# Patient Record
Sex: MALE | Race: WHITE | NOT HISPANIC OR LATINO | Employment: UNEMPLOYED | URBAN - METROPOLITAN AREA
[De-identification: names, ages, dates, MRNs, and addresses within clinical notes are randomized per-mention and may not be internally consistent; named-entity substitution may affect disease eponyms.]

---

## 2017-06-05 ENCOUNTER — OFFICE VISIT (OUTPATIENT)
Dept: PEDIATRICS | Facility: CLINIC | Age: 32
End: 2017-06-05
Payer: COMMERCIAL

## 2017-06-05 VITALS
HEIGHT: 69 IN | SYSTOLIC BLOOD PRESSURE: 98 MMHG | DIASTOLIC BLOOD PRESSURE: 70 MMHG | HEART RATE: 97 BPM | BODY MASS INDEX: 21.62 KG/M2 | TEMPERATURE: 99.6 F | OXYGEN SATURATION: 97 % | WEIGHT: 146 LBS

## 2017-06-05 DIAGNOSIS — F43.23 ADJUSTMENT DISORDER WITH MIXED ANXIETY AND DEPRESSED MOOD: Primary | ICD-10-CM

## 2017-06-05 PROCEDURE — 99203 OFFICE O/P NEW LOW 30 MIN: CPT | Performed by: INTERNAL MEDICINE

## 2017-06-05 RX ORDER — ALPRAZOLAM 0.5 MG
0.5 TABLET ORAL 3 TIMES DAILY PRN
Qty: 20 TABLET | Refills: 0 | Status: SHIPPED | OUTPATIENT
Start: 2017-06-05 | End: 2017-06-26

## 2017-06-05 RX ORDER — BUSPIRONE HYDROCHLORIDE 5 MG/1
TABLET ORAL
Qty: 120 TABLET | Refills: 1 | Status: SHIPPED | OUTPATIENT
Start: 2017-06-05 | End: 2017-06-30 | Stop reason: SINTOL

## 2017-06-05 ASSESSMENT — ANXIETY QUESTIONNAIRES
IF YOU CHECKED OFF ANY PROBLEMS ON THIS QUESTIONNAIRE, HOW DIFFICULT HAVE THESE PROBLEMS MADE IT FOR YOU TO DO YOUR WORK, TAKE CARE OF THINGS AT HOME, OR GET ALONG WITH OTHER PEOPLE: EXTREMELY DIFFICULT
6. BECOMING EASILY ANNOYED OR IRRITABLE: MORE THAN HALF THE DAYS
1. FEELING NERVOUS, ANXIOUS, OR ON EDGE: NEARLY EVERY DAY
GAD7 TOTAL SCORE: 16
5. BEING SO RESTLESS THAT IT IS HARD TO SIT STILL: MORE THAN HALF THE DAYS
7. FEELING AFRAID AS IF SOMETHING AWFUL MIGHT HAPPEN: SEVERAL DAYS
3. WORRYING TOO MUCH ABOUT DIFFERENT THINGS: NEARLY EVERY DAY
2. NOT BEING ABLE TO STOP OR CONTROL WORRYING: NEARLY EVERY DAY

## 2017-06-05 ASSESSMENT — PATIENT HEALTH QUESTIONNAIRE - PHQ9: 5. POOR APPETITE OR OVEREATING: MORE THAN HALF THE DAYS

## 2017-06-05 NOTE — PROGRESS NOTES
"  SUBJECTIVE:                                                    Zeferino Reyna is a 31 year old male who presents to clinic today for the following health issues:      Abnormal Mood Symptoms      Duration: 2-3 weeks     Description:  Depression: YES, pt recently broke up with his girlfriend   Anxiety: YES  Panic attacks: YES- starts to feel shaky, goes to a different room to cry      Accompanying signs and symptoms: see PHQ-9 and UVALDO scores    History (similar episodes/previous evaluation): previously on medication     Precipitating or alleviating factors: None    Therapies tried and outcome: Lexapro (Escitalopram), pt took this in high school and caused him to feel numb     Having significant symptoms over the past several weeks.  Difficulty concentrating at times.  Has needed to leave work 1-2 days per week due to depressive and anxious symptoms, unable to properly do his work.    As noted above, has taken Lexapro in the past, would like to try a different medication.    PHQ-9 SCORE 6/5/2017   Total Score 19     UVALDO-7 SCORE 6/5/2017   Total Score 16     Reviewed sx along with these evaluations. He feels that anxiety is the main driving force for his sx which are interfering with his work.    Problem list and histories reviewed & adjusted, as indicated.    Labs reviewed in EPIC  TSH done in 2010 was normal    Reviewed and updated as needed this visit by clinical staff  Tobacco  Allergies  Med Hx  Surg Hx  Fam Hx  Soc Hx      Reviewed and updated as needed this visit by Provider  Tobacco  Allergies  Meds  Problems  Med Hx  Surg Hx  Fam Hx  Soc Hx          ROS:  Constitutional, HEENT, cardiovascular, pulmonary, gi and gu systems are negative, except as otherwise noted.    OBJECTIVE:                                                    BP 98/70 (BP Location: Right arm, Patient Position: Chair, Cuff Size: Adult Regular)  Pulse 97  Temp 99.6  F (37.6  C) (Tympanic)  Ht 5' 8.5\" (1.74 m)  Wt 146 lb (66.2 " kg)  SpO2 97%  BMI 21.88 kg/m2  Body mass index is 21.88 kg/(m^2).  GENERAL: healthy, alert and no distress  EYES: Eyes grossly normal to inspection, PERRL and conjunctivae and sclerae normal  HENT: no nasal d/c. OP moist.   NECK: no adenopathy, no asymmetry, masses, or scars and thyroid normal to palpation  RESP: lungs clear to auscultation - no rales, rhonchi or wheezes  CV: regular rate and rhythm, normal S1 S2, no S3 or S4, no murmur, click or rub, no peripheral edema and peripheral pulses strong  ABDOMEN: soft, nontender, no hepatosplenomegaly, no masses and bowel sounds normal  MS: no gross musculoskeletal defects noted, no edema  SKIN: no suspicious lesions or rashes  PSYCH: mentation appears normal, affect anxious, well groomed.     ASSESSMENT/PLAN:                                                        ICD-10-CM    1. Adjustment disorder with mixed anxiety and depressed mood F43.23 busPIRone (BUSPAR) 5 MG tablet     ALPRAZolam (XANAX) 0.5 MG tablet     Sx are most c/w adjustment disorder w/ sx of anxiety and depression.  Discussed management options.    Patient Instructions   Start Buspar (buspirone) 5 mg tablets   1 twice per day for 1 week, then increase to 1.5 twice per day for 1 week, then increase to 2 twice per day    Alprazolam (Xanax) 0.5 mg tablets   1 tablet up to 3 times per day as needed for more severe anxiety symptoms    Next visit with me in 3 weeks         Ethan Adams MD  [facility]

## 2017-06-05 NOTE — NURSING NOTE
"Chief Complaint   Patient presents with     Depression       Initial BP 98/70 (BP Location: Right arm, Patient Position: Chair, Cuff Size: Adult Regular)  Pulse 97  Temp 99.6  F (37.6  C) (Tympanic)  Ht 5' 8.5\" (1.74 m)  Wt 146 lb (66.2 kg)  SpO2 97%  BMI 21.88 kg/m2 Estimated body mass index is 21.88 kg/(m^2) as calculated from the following:    Height as of this encounter: 5' 8.5\" (1.74 m).    Weight as of this encounter: 146 lb (66.2 kg).  Medication Reconciliation: complete  "

## 2017-06-05 NOTE — PATIENT INSTRUCTIONS
Start Buspar (buspirone) 5 mg tablets   1 twice per day for 1 week, then increase to 1.5 twice per day for 1 week, then increase to 2 twice per day    Alprazolam (Xanax) 0.5 mg tablets   1 tablet up to 3 times per day as needed for more severe anxiety symptoms    Next visit with me in 3 weeks

## 2017-06-05 NOTE — MR AVS SNAPSHOT
After Visit Summary   6/5/2017    Zeferino Reyna    MRN: [id number]           Patient Information     Date Of Birth          1985        Visit Information        Provider Department      6/5/2017 1:20 PM Ethan Adams MD [facility]        Today's Diagnoses     Adjustment disorder with mixed anxiety and depressed mood    -  1      Care Instructions    Start Buspar (buspirone) 5 mg tablets   1 twice per day for 1 week, then increase to 1.5 twice per day for 1 week, then increase to 2 twice per day    Alprazolam (Xanax) 0.5 mg tablets   1 tablet up to 3 times per day as needed for more severe anxiety symptoms    Next visit with me in 3 weeks                Follow-ups after your visit        Your next 10 appointments already scheduled     Jun 26, 2017  1:20 PM CDT   Office Visit with Ethan Adams MD   [facility] ([facility])    [address]  [address]  [place] [address]   [phone]           Bring a current list of meds and any records pertaining to this visit.  For Physicals, please bring immunization records and any forms needing to be filled out.  Please arrive 10 minutes early to complete paperwork.              Who to contact     If you have questions or need follow up information about today's clinic visit or your schedule please contact [facility] directly at [phone].  Normal or non-critical lab and imaging results will be communicated to you by MyChart, letter or phone within 4 business days after the clinic has received the results. If you do not hear from us within 7 days, please contact the clinic through MyChart or phone. If you have a critical or abnormal lab result, we will notify you by phone as soon as possible.  Submit refill requests through [employer] or call your pharmacy and they will forward the refill request to us. Please allow 3 business days for your refill to be completed.          Additional  "Information About Your Visit        MyChart Information     [employer] lets you send messages to your doctor, view your test results, renew your prescriptions, schedule appointments and more. To sign up, go to [email or web address] . Click on \"Log in\" on the left side of the screen, which will take you to the Welcome page. Then click on \"Sign up Now\" on the right side of the page.     You will be asked to enter the access code listed below, as well as some personal information. Please follow the directions to create your username and password.     Your access code is: [email or web address]  Expires: 9/3/2017  2:03 PM     Your access code will  in 90 days. If you need help or a new code, please call your [place] clinic or [phone].        Care EveryWhere ID     This is your Care EveryWhere ID. This could be used by other organizations to access your [place] medical records  [id number]        Your Vitals Were     Pulse Temperature Height Pulse Oximetry BMI (Body Mass Index)       97 99.6  F (37.6  C) (Tympanic) 5' 8.5\" (1.74 m) 97% 21.88 kg/m2        Blood Pressure from Last 3 Encounters:   17 98/70   14 119/71   11 100/70    Weight from Last 3 Encounters:   17 146 lb (66.2 kg)   14 150 lb (68 kg)   11 137 lb 1.6 oz (62.2 kg)              Today, you had the following     No orders found for display         Today's Medication Changes          These changes are accurate as of: 17  2:03 PM.  If you have any questions, ask your nurse or doctor.               Start taking these medicines.        Dose/Directions    ALPRAZolam 0.5 MG tablet   Commonly known as:  XANAX   Used for:  Adjustment disorder with mixed anxiety and depressed mood   Started by:  Ethan Adams MD        Dose:  0.5 mg   Take 1 tablet (0.5 mg) by mouth 3 times daily as needed for anxiety   Quantity:  20 tablet   Refills:  0       busPIRone 5 MG tablet   Commonly known as:  BUSPAR   Used for:  Adjustment " disorder with mixed anxiety and depressed mood   Started by:  Ethan Adams MD        1 po BID x 1 week, then 1.5 po BID x 1 week then 2 po BID   Quantity:  120 tablet   Refills:  1         These medicines have changed or have updated prescriptions.        Dose/Directions    * amphetamine-dextroamphetamine 30 MG per 24 hr capsule   Commonly known as:  ADDERALL XR   This may have changed:    - how much to take  - additional instructions   Used for:  Attention deficit disorder of adult   Changed by:  Lukas Gonzalez MD        Dose:  30 mg   Take 1 capsule by mouth daily. Along with 10 mg tables   Quantity:  30 capsule   Refills:  0       * amphetamine-dextroamphetamine 10 MG per tablet   Commonly known as:  ADDERALL   This may have changed:  Another medication with the same name was changed. Make sure you understand how and when to take each.   Used for:  Attention deficit disorder of adult   Changed by:  Lukas Gonzalez MD        Dose:  10 mg   Take 1 tablet by mouth daily.   Quantity:  30 tablet   Refills:  0       * Notice:  This list has 2 medication(s) that are the same as other medications prescribed for you. Read the directions carefully, and ask your doctor or other care provider to review them with you.         Where to get your medicines      These medications were sent to [facility] - [place], [place] - [address] [place] [address] AT [address], [place] [address]     Phone:  [phone]     busPIRone 5 MG tablet         Some of these will need a paper prescription and others can be bought over the counter.  Ask your nurse if you have questions.     Bring a paper prescription for each of these medications     ALPRAZolam 0.5 MG tablet                Primary Care Provider Office Phone # Fax #    Ethan Adams [phone]       [facility] [address] [place] [address] [address] [place] [address]        Thank you!     Thank you for choosing [facility]  for your care. Our goal is always to provide you with excellent care. Hearing back from our patients is one way we can continue to improve our services. Please take a few minutes to complete the written survey that you may receive in the mail after your visit with us. Thank you!             Your Updated Medication List - Protect others around you: Learn how to safely use, store and throw away your medicines at www.disposemymeds.org.          This list is accurate as of: 6/5/17  2:03 PM.  Always use your most recent med list.                   Brand Name Dispense Instructions for use    ALPRAZolam 0.5 MG tablet    XANAX    20 tablet    Take 1 tablet (0.5 mg) by mouth 3 times daily as needed for anxiety       * amphetamine-dextroamphetamine 30 MG per 24 hr capsule    ADDERALL XR    30 capsule    Take 1 capsule by mouth daily. Along with 10 mg tables       * amphetamine-dextroamphetamine 10 MG per tablet    ADDERALL    30 tablet    Take 1 tablet by mouth daily.       busPIRone 5 MG tablet    BUSPAR    120 tablet    1 po BID x 1 week, then 1.5 po BID x 1 week then 2 po BID       MULTIVITAMIN PO      Take 1 tablet by mouth daily       OMEGA-3 FISH OIL PO      Take 1 tablet by mouth daily       VITAMIN C PO      Take 1 tablet by mouth daily       * Notice:  This list has 2 medication(s) that are the same as other medications prescribed for you. Read the directions carefully, and ask your doctor or other care provider to review them with you.

## 2017-06-07 ENCOUNTER — TELEPHONE (OUTPATIENT)
Dept: PEDIATRICS | Facility: CLINIC | Age: 32
End: 2017-06-07

## 2017-06-07 NOTE — TELEPHONE ENCOUNTER
Reason for Call:  Form, our goal is to have forms completed with 72 hours, however, some forms may require a visit or additional information.    Type of letter, form or note:  FMLA    Who is the form from?: Patient    Where did the form come from: form was faxed in    What clinic location was the form placed at?: [place]    Where the form was placed: Patient stated he faxed the forms directly to the station at [phone]    What number is listed as a contact on the form?: patient's home phone [phone]       Additional comments: Patient would like provider to know that it is time sensitive. Please fax the forms back to the number listed on them no later than Monday 6/12.    Ingrid Holt,   [facility]

## 2017-06-08 ASSESSMENT — ANXIETY QUESTIONNAIRES: GAD7 TOTAL SCORE: 16

## 2017-06-08 ASSESSMENT — PATIENT HEALTH QUESTIONNAIRE - PHQ9: SUM OF ALL RESPONSES TO PHQ QUESTIONS 1-9: 19

## 2017-06-19 NOTE — TELEPHONE ENCOUNTER
Patient called stating that his employer had not received his FMLA forms yet.     Refaxed the forms to:  [phone] Attn: Absence Management Team    Ingrid Holt,   [facility]

## 2017-06-26 ENCOUNTER — OFFICE VISIT (OUTPATIENT)
Dept: PEDIATRICS | Facility: CLINIC | Age: 32
End: 2017-06-26
Payer: COMMERCIAL

## 2017-06-26 VITALS
SYSTOLIC BLOOD PRESSURE: 102 MMHG | BODY MASS INDEX: 21.92 KG/M2 | HEIGHT: 69 IN | WEIGHT: 148 LBS | TEMPERATURE: 97.9 F | HEART RATE: 85 BPM | DIASTOLIC BLOOD PRESSURE: 56 MMHG | OXYGEN SATURATION: 98 %

## 2017-06-26 DIAGNOSIS — F43.23 ADJUSTMENT DISORDER WITH MIXED ANXIETY AND DEPRESSED MOOD: Primary | ICD-10-CM

## 2017-06-26 DIAGNOSIS — M25.532 LEFT WRIST PAIN: ICD-10-CM

## 2017-06-26 PROCEDURE — 99214 OFFICE O/P EST MOD 30 MIN: CPT | Performed by: INTERNAL MEDICINE

## 2017-06-26 RX ORDER — ALPRAZOLAM 0.5 MG
0.5 TABLET ORAL 3 TIMES DAILY PRN
Qty: 30 TABLET | Refills: 1 | Status: SHIPPED | OUTPATIENT
Start: 2017-06-26 | End: 2019-06-21

## 2017-06-26 NOTE — PROGRESS NOTES
"    SUBJECTIVE:                                                    Zeferino eRyna is a 31 year old male who presents to clinic today for the following health issues:      Anxiety Follow-Up    Status since last visit: Improved within the last few days.     Other associated symptoms: Has been two days where pt woke up and took Buspar and states he \"felt drunk\".    Complicating factors:   Significant life event: Yes-  Broke up with gf.    Current substance abuse: None  Depression symptoms: Yes-  Improved since last visit.   UVALDO-7 SCORE 6/5/2017   Total Score 16     New counselor last week, helpful. Has f/u visit scheduled.     He had been increasing Buspar dose, had reached the 10 mg twice per day dose when he started to note possible side effects as noted above. Decreased back to 5 mg twice per day and seems to be tolerating this dose.     Has been taking alprazolam intermittently. Feels that this does help with with his flares of anxiety.    Has been missing work a few days d/t increased sx.     Left wrist pain  Sx ongoing for the past few weeks.  No specific injury recalled.   Pain is on the lateral aspect of the wrist, over the dorsal aspect of the first metacarpal area.   No wrist swelling noted.   Sx better w/ rest. Worse w/ activity and movement.       Amount of exercise or physical activity: 6-7 days/week for an average of 45-60 minutes    Problems taking medications regularly: No    Medication side effects: feeling \"drunk\".    Diet: regular (no restrictions)    Problem list and histories reviewed & adjusted, as indicated.    Reviewed and updated as needed this visit by clinical staff  Tobacco  Allergies  Meds  Med Hx  Surg Hx  Fam Hx  Soc Hx        ROS:  Constitutional, HEENT, cardiovascular, pulmonary, gi and gu systems are negative, except as otherwise noted.    OBJECTIVE:     /56 (Cuff Size: Adult Regular)  Pulse 85  Temp 97.9  F (36.6  C) (Oral)  Ht 5' 8.5\" (1.74 m)  Wt 148 lb (67.1 kg)  " SpO2 98%  BMI 22.18 kg/m2  Body mass index is 22.18 kg/(m^2).  GEN: No distress  SKIN: No rashes  M/S: Discomfort w/ palpation over the lateral and dorsal aspect of the right first metacarpal. Sx worse w/ resisted thumb extension. No effusion noted. No other joint pains or effusions noted in the hand.   NEURO: Strength is intact. Sensation intact in the hand.  PSYCH: slightly anxious affect. Well groomed. Good eye contact.     ASSESSMENT/PLAN:       ICD-10-CM    1. Adjustment disorder with mixed anxiety and depressed mood F43.23 ALPRAZolam (XANAX) 0.5 MG tablet   2. Left wrist pain M25.532      Persistent anxiety and depression sx, likely related to life adjustment w/ his relationship change.  Having possible side effects w/ Buspar higher dose, tolerating 5 mg twice per day. Continue w/ this dose.  If side effects do not todd, consider changing to SSRI.  Alprazolam prn.    Wrist pain. Possible tendinitis. Wrist brace. RICE. Consider FSOC or ortho referral if sx persist.       Ethan Adams MD  [facility]

## 2017-06-26 NOTE — PATIENT INSTRUCTIONS
Keep your Buspar dose at 10 mg twice per day (2 of the 5 mg tablets per dose)    The dose can be increased in a few weeks if needed - call if you would like to increase the dose    Continue with alprazolam as needed   Do not combine with alcohol    Next visit in about 1 month                   De Quervain's Tenosynovitis      What is de Quervain's tenosynovitis?   De Quervain's tenosynovitis is a painful condition that affects the tendons on the thumb side of your wrist. Tendons, are strong bands of connective tissue that attach muscle to bone. A sheath, or covering, surrounds the tendons that go to your thumb. Tenosynovitis is an irritation of this sheath.   How does it occur?   De Quervain's tenosynovitis is usually cause by overusing your thumb or wrist. This is more likely in activities when your wrist is bent and you use your thumb to  something (such as when you ski or hammer).   Other causes of this condition include:   wrist injuries   rheumatoid arthritis.   What are the symptoms?   Symptoms may include:   pain when you move your thumb or wrist   pain when you make a fist   swelling and pain on the thumb side of your wrist   feeling or hearing creaking as the tendon moves   How is it diagnosed?   Your healthcare provider will examine your wrist and thumb and check for areas that are tender and painful to move. You may have an X-ray to be sure you don't have a broken bone.   How is it treated?   The first treatment is a splint that will cover your wrist and thumb. You need to protect your thumb and wrist. Treatment may also include:   Put an ice pack, gel pack, or package of frozen vegetables, wrapped in a cloth on your thumb and wrist every 3 to 4 hours, for up to 20 minutes at a time.   You could also do ice massage. To do this, first freeze water in a Styrofoam cup, then peel the top of the cup away to expose the ice. Hold the bottom of the cup and rub the ice over your tendon for 5 to 10 minutes. Do  this 3 to 5 times a day for the first 2 days.   Take an anti-inflammatory medicine such as ibuprofen, or other medicine as directed by your provider. Nonsteroidal anti-inflammatory medicines (NSAIDs) may cause stomach bleeding and other problems. These risks increase with age. Read the label and take as directed. Unless recommended by your healthcare provider, do not take for more than 10 days.   Your provider may give you an injection of a corticosteroid medicine.   Follow your provider's instructions for doing exercises to help you recover.   How long will the effects last?   The length of recovery depends on factors such as your age, health, and if you have had a previous injury. Recovery time also depends on the severity of the injury. A mild injury may recover within a few weeks, but a severe injury may take 6 weeks or longer to recover.   When can I return to my normal activities?   Everyone recovers from an injury at a different rate. Return to your activities depends on how soon your wrist recovers, not by how many days or weeks it has been since your injury has occurred. In general, the longer you have symptoms before you start treatment, the longer it will take to get better. The goal of rehabilitation is to return you to your normal activities as soon as is safely possible.   You need to stop doing the activities that cause pain until the tendon has healed. If you continue doing activities that cause pain, your symptoms will return and it will take longer to recover. You may return to your normal activities when it is no longer painful to move your thumb or wrist. You may need to do activities wearing a supportive splint until you no longer have symptoms.   How can I prevent de Quervain's tenosynovitis?   Avoiding activities that overuse your thumb or wrist may prevent de Quervain's tenosynovitis.     De Quervain's Tenosynovitis Rehabilitation Exercises          You may do these exercises when it is not  painful to move your hand.   Opposition stretch: Rest your hand on a table, palm up. Touch the tip of your thumb to the tip of your little finger. Hold this position for 6 seconds and then release. Repeat 10 times.   Wrist stretch: Press the back of the hand on your injured side with your other hand to help bend your wrist. Hold for 15 to 30 seconds. Next, stretch the hand back by pressing the fingers in a backward direction. Hold for 15 to 30 seconds. Keep the arm on your injured side straight during this exercise. Do 3 sets.   Wrist flexion: Hold a can or hammer handle in your hand with your palm facing up. Bend your wrist upward. Slowly lower the weight and return to the starting position. Do 3 sets of 10. Gradually increase the weight of the can or weight you are holding.   Wrist radial deviation strengthening: Put your wrist in the sideways position with your thumb up. Hold a can of soup or a hammer handle and gently bend your wrist up, with the thumb reaching toward the ceiling. Slowly lower to the starting position. Do not move your forearm throughout this exercise. Do 3 sets of 10.   Wrist extension: Hold a soup can or hammer handle in your hand with your palm facing down. Slowly bend your wrist up. Slowly lower the weight down into the starting position. Do 3 sets of 10. Gradually increase the weight of the object you are holding.    strengthening: Squeeze a soft rubber ball and hold the squeeze for 5 seconds. Do 3 sets of 10.   Finger spring: Place a large rubber band around the outside of your thumb and fingers. Open your fingers to stretch the rubber band. Do 3 sets of 10.   Published by [employer].  This content is reviewed periodically and is subject to change as new health information becomes available. The information is intended to inform and educate and is not a replacement for medical evaluation, advice, diagnosis or treatment by a healthcare professional.   Written by Archie Moreno PT, and  Whitney Mcknight PT, [facility], [facility], for [employer].   ? 2010 [facility] and/or its affiliates. All Rights Reserved.   Copyright   [employer] 2011          Published by [employer].  This content is reviewed periodically and is subject to change as new health information becomes available. The information is intended to inform and educate and is not a replacement for medical evaluation, advice, diagnosis or treatment by a healthcare professional.   Written by Mihai Gavin MD, for [employer].   ? 2010 [facility] and/or its affiliates. All Rights Reserved.   Copyright   [employer] 2011

## 2017-06-26 NOTE — MR AVS SNAPSHOT
After Visit Summary   6/26/2017    Zeferino Reyna    MRN: [id number]           Patient Information     Date Of Birth          1985        Visit Information        Provider Department      6/26/2017 1:20 PM Ethan Adams MD [facility]        Today's Diagnoses     Adjustment disorder with mixed anxiety and depressed mood          Care Instructions    Keep your Buspar dose at 10 mg twice per day (2 of the 5 mg tablets per dose)    The dose can be increased in a few weeks if needed - call if you would like to increase the dose    Continue with alprazolam as needed   Do not combine with alcohol    Next visit in about 1 month                   De Quervain's Tenosynovitis      What is de Quervain's tenosynovitis?   De Quervain's tenosynovitis is a painful condition that affects the tendons on the thumb side of your wrist. Tendons, are strong bands of connective tissue that attach muscle to bone. A sheath, or covering, surrounds the tendons that go to your thumb. Tenosynovitis is an irritation of this sheath.   How does it occur?   De Quervain's tenosynovitis is usually cause by overusing your thumb or wrist. This is more likely in activities when your wrist is bent and you use your thumb to  something (such as when you ski or hammer).   Other causes of this condition include:   wrist injuries   rheumatoid arthritis.   What are the symptoms?   Symptoms may include:   pain when you move your thumb or wrist   pain when you make a fist   swelling and pain on the thumb side of your wrist   feeling or hearing creaking as the tendon moves   How is it diagnosed?   Your healthcare provider will examine your wrist and thumb and check for areas that are tender and painful to move. You may have an X-ray to be sure you don't have a broken bone.   How is it treated?   The first treatment is a splint that will cover your wrist and thumb. You need to protect your thumb and wrist. Treatment may also  include:   Put an ice pack, gel pack, or package of frozen vegetables, wrapped in a cloth on your thumb and wrist every 3 to 4 hours, for up to 20 minutes at a time.   You could also do ice massage. To do this, first freeze water in a Styrofoam cup, then peel the top of the cup away to expose the ice. Hold the bottom of the cup and rub the ice over your tendon for 5 to 10 minutes. Do this 3 to 5 times a day for the first 2 days.   Take an anti-inflammatory medicine such as ibuprofen, or other medicine as directed by your provider. Nonsteroidal anti-inflammatory medicines (NSAIDs) may cause stomach bleeding and other problems. These risks increase with age. Read the label and take as directed. Unless recommended by your healthcare provider, do not take for more than 10 days.   Your provider may give you an injection of a corticosteroid medicine.   Follow your provider's instructions for doing exercises to help you recover.   How long will the effects last?   The length of recovery depends on factors such as your age, health, and if you have had a previous injury. Recovery time also depends on the severity of the injury. A mild injury may recover within a few weeks, but a severe injury may take 6 weeks or longer to recover.   When can I return to my normal activities?   Everyone recovers from an injury at a different rate. Return to your activities depends on how soon your wrist recovers, not by how many days or weeks it has been since your injury has occurred. In general, the longer you have symptoms before you start treatment, the longer it will take to get better. The goal of rehabilitation is to return you to your normal activities as soon as is safely possible.   You need to stop doing the activities that cause pain until the tendon has healed. If you continue doing activities that cause pain, your symptoms will return and it will take longer to recover. You may return to your normal activities when it is no  longer painful to move your thumb or wrist. You may need to do activities wearing a supportive splint until you no longer have symptoms.   How can I prevent de Quervain's tenosynovitis?   Avoiding activities that overuse your thumb or wrist may prevent de Quervain's tenosynovitis.     De Quervain's Tenosynovitis Rehabilitation Exercises          You may do these exercises when it is not painful to move your hand.   Opposition stretch: Rest your hand on a table, palm up. Touch the tip of your thumb to the tip of your little finger. Hold this position for 6 seconds and then release. Repeat 10 times.   Wrist stretch: Press the back of the hand on your injured side with your other hand to help bend your wrist. Hold for 15 to 30 seconds. Next, stretch the hand back by pressing the fingers in a backward direction. Hold for 15 to 30 seconds. Keep the arm on your injured side straight during this exercise. Do 3 sets.   Wrist flexion: Hold a can or hammer handle in your hand with your palm facing up. Bend your wrist upward. Slowly lower the weight and return to the starting position. Do 3 sets of 10. Gradually increase the weight of the can or weight you are holding.   Wrist radial deviation strengthening: Put your wrist in the sideways position with your thumb up. Hold a can of soup or a hammer handle and gently bend your wrist up, with the thumb reaching toward the ceiling. Slowly lower to the starting position. Do not move your forearm throughout this exercise. Do 3 sets of 10.   Wrist extension: Hold a soup can or hammer handle in your hand with your palm facing down. Slowly bend your wrist up. Slowly lower the weight down into the starting position. Do 3 sets of 10. Gradually increase the weight of the object you are holding.    strengthening: Squeeze a soft rubber ball and hold the squeeze for 5 seconds. Do 3 sets of 10.   Finger spring: Place a large rubber band around the outside of your thumb and fingers. Open  your fingers to stretch the rubber band. Do 3 sets of 10.   Published by [employer].  This content is reviewed periodically and is subject to change as new health information becomes available. The information is intended to inform and educate and is not a replacement for medical evaluation, advice, diagnosis or treatment by a healthcare professional.   Written by Archie Moreno PT, and Whitney Mcknight PT, [facility], [facility], for [employer].   ? 2010 [employer] and/or its affiliates. All Rights Reserved.   Copyright   [employer] 2011          Published by [employer].  This content is reviewed periodically and is subject to change as new health information becomes available. The information is intended to inform and educate and is not a replacement for medical evaluation, advice, diagnosis or treatment by a healthcare professional.   Written by Mihai Gavin MD, for [employer].   ? 2010 [employer] and/or its affiliates. All Rights Reserved.   [employer]   Clinical Reference [employer] 2011                Follow-ups after your visit        Who to contact     If you have questions or need follow up information about today's clinic visit or your schedule please contact [facility] directly at [phone].  Normal or non-critical lab and imaging results will be communicated to you by [employer], letter or phone within 4 business days after the clinic has received the results. If you do not hear from us within 7 days, please contact the clinic through [employer] or phone. If you have a critical or abnormal lab result, we will notify you by phone as soon as possible.  Submit refill requests through [employer] or call your pharmacy and they will forward the refill request to us. Please allow 3 business days for your refill to be completed.          Additional Information About Your Visit        [employer] Information     [employer] lets you send messages to your doctor, view your test results, renew your  "prescriptions, schedule appointments and more. To sign up, go to [email or web address] . Click on \"Log in\" on the left side of the screen, which will take you to the Welcome page. Then click on \"Sign up Now\" on the right side of the page.     You will be asked to enter the access code listed below, as well as some personal information. Please follow the directions to create your username and password.     Your access code is: [email or web address]  Expires: 9/3/2017  2:03 PM     Your access code will  in 90 days. If you need help or a new code, please call your [place] clinic or [phone].        Care EveryWhere ID     This is your Care EveryWhere ID. This could be used by other organizations to access your [place] medical records  [id number]        Your Vitals Were     Pulse Temperature Height Pulse Oximetry BMI (Body Mass Index)       85 97.9  F (36.6  C) (Oral) 5' 8.5\" (1.74 m) 98% 22.18 kg/m2        Blood Pressure from Last 3 Encounters:   17 102/56   17 98/70   14 119/71    Weight from Last 3 Encounters:   17 148 lb (67.1 kg)   17 146 lb (66.2 kg)   14 150 lb (68 kg)              Today, you had the following     No orders found for display         Today's Medication Changes          These changes are accurate as of: 17  1:47 PM.  If you have any questions, ask your nurse or doctor.               These medicines have changed or have updated prescriptions.        Dose/Directions    * amphetamine-dextroamphetamine 30 MG per 24 hr capsule   Commonly known as:  ADDERALL XR   This may have changed:    - how much to take  - additional instructions   Used for:  Attention deficit disorder of adult   Changed by:  Lukas Gonzalez MD        Dose:  30 mg   Take 1 capsule by mouth daily. Along with 10 mg tables   Quantity:  30 capsule   Refills:  0       * amphetamine-dextroamphetamine 10 MG per tablet   Commonly known as:  ADDERALL   This may have changed:  Another " medication with the same name was changed. Make sure you understand how and when to take each.   Used for:  Attention deficit disorder of adult   Changed by:  Lukas Gonzalez MD        Dose:  10 mg   Take 1 tablet by mouth daily.   Quantity:  30 tablet   Refills:  0       * Notice:  This list has 2 medication(s) that are the same as other medications prescribed for you. Read the directions carefully, and ask your doctor or other care provider to review them with you.         Where to get your medicines      Some of these will need a paper prescription and others can be bought over the counter.  Ask your nurse if you have questions.     Bring a paper prescription for each of these medications     ALPRAZolam 0.5 MG tablet                Primary Care Provider Office Phone # Fax #    Ethan Adams [phone]       [address] [place] [address]        Equal Access to Services     [facility]: Hadii reema lara hadasho Soomaali, waaxda luqadaha, qaybta kaalmada blancoyarosalind, andrey roth . So [facility] [phone].    ATENCIÓN: Si habla español, tiene a rizvi disposición servicios gratuitos de asistencia lingüística. Llame al [phone].    We comply with applicable federal civil rights laws and [place] laws. We do not discriminate on the basis of race, color, national origin, age, disability sex, sexual orientation or gender identity.            Thank you!     Thank you for choosing [facility]  for your care. Our goal is always to provide you with excellent care. Hearing back from our patients is one way we can continue to improve our services. Please take a few minutes to complete the written survey that you may receive in the mail after your visit with us. Thank you!             Your Updated Medication List - Protect others around you: Learn how to safely use, store and throw away your medicines at www.disposemymeds.org.           This list is accurate as of: 6/26/17  1:47 PM.  Always use your most recent med list.                   Brand Name Dispense Instructions for use Diagnosis    ALPRAZolam 0.5 MG tablet    XANAX    30 tablet    Take 1 tablet (0.5 mg) by mouth 3 times daily as needed for anxiety    Adjustment disorder with mixed anxiety and depressed mood       * amphetamine-dextroamphetamine 30 MG per 24 hr capsule    ADDERALL XR    30 capsule    Take 1 capsule by mouth daily. Along with 10 mg tables    Attention deficit disorder of adult       * amphetamine-dextroamphetamine 10 MG per tablet    ADDERALL    30 tablet    Take 1 tablet by mouth daily.    Attention deficit disorder of adult       busPIRone 5 MG tablet    BUSPAR    120 tablet    1 po BID x 1 week, then 1.5 po BID x 1 week then 2 po BID    Adjustment disorder with mixed anxiety and depressed mood       MULTIVITAMIN PO      Take 1 tablet by mouth daily        OMEGA-3 FISH OIL PO      Take 1 tablet by mouth daily        VITAMIN C PO      Take 1 tablet by mouth daily        * Notice:  This list has 2 medication(s) that are the same as other medications prescribed for you. Read the directions carefully, and ask your doctor or other care provider to review them with you.

## 2017-06-27 ENCOUNTER — TELEPHONE (OUTPATIENT)
Dept: PEDIATRICS | Facility: CLINIC | Age: 32
End: 2017-06-27

## 2017-06-27 NOTE — TELEPHONE ENCOUNTER
This writer called pt back advise of note below per PCP 5 mg BID pt verbalized understanding. Will call back at his convince to schedule follow up //Maira Seals MA// June 27, 2017 1:57 PM

## 2017-06-27 NOTE — TELEPHONE ENCOUNTER
Please call:  He should go to 5 mg BID and remain at that dose until he sees me again next month.

## 2017-06-27 NOTE — TELEPHONE ENCOUNTER
Pt. reports continued s/e of Buspar that occurred beginning of last week.    Feels drunk on medication, weak, tired, lethargic, hard time standing up straight, nauseated, and dizzy since taking the full dose (10mg BID). He reports he is not able to function properly at work. Triage did ask if he was taking Buspar along with the Xanax in which he denied.  He will take Buspar at 9pm and Xanax sometime after midnight.    He is ok with taking just the 1 tablet BID to see if this would work better. Ok to lower dose or discontinue?    PCP please advise.     Cristy FERRER RN, BSN, PHN  [facility] RN

## 2017-06-29 ENCOUNTER — TELEPHONE (OUTPATIENT)
Dept: PEDIATRICS | Facility: CLINIC | Age: 32
End: 2017-06-29

## 2017-06-29 NOTE — TELEPHONE ENCOUNTER
Symptoms have improved since stopping the Buspar.  Will remain off the Buspar.    Forwarded to Dr. Adams as well for any further recommendations as he didn't tolerate the Buspar.  KARLI Isaacs RN

## 2017-06-29 NOTE — TELEPHONE ENCOUNTER
Just to clarify:    Patient was taking 10mg BID up until 6/27/17, when he was decreased to 5mg BID, which he took for one day (as it sounds like his last dose was 6/28/17 in the AM).    If the above is true AND if his side effects/symptoms have worsened since tapering, I would recommend that he continue to take 5mg BID x3 more days then stop.    If his side effects are improving, then he likely does not need to taper off at this point.    Kathy Lackey MD  Internal Medicine-Pediatrics

## 2017-06-29 NOTE — TELEPHONE ENCOUNTER
Discussed with Dr. Lackey.  If symptoms are no worse, may just stop the Buspar.  If symptoms not improved by Monday, will need to schedule an appointment for evaluation.  KARLI Isaacs RN

## 2017-06-29 NOTE — TELEPHONE ENCOUNTER
"Patient began Buspar three weeks ago,  and titrated to 10 mg twice daily.  Has been on this dose for 1.5 weeks.  On 06/27,he was titrated down to 5 mg twice daily due to side effects.  Yesterday am he took his last dose of Buspar.  Even on lower dose, he notes side effects of feeling nauseated and vomiting, diarrhea, and  feels dizzy.  Had to leave work yesterday because it is difficult to concentrate  It \"feels like I'm drunk\".  Patient would like to stop this medication-any need to titrate off, or can he just stop this medication  Do you know how long side effect will last?  Please call patient at [phone] (H).  OK TO leave a detailed message.  KARLI Isaacs RN    "

## 2017-06-30 ENCOUNTER — TELEPHONE (OUTPATIENT)
Dept: PEDIATRICS | Facility: CLINIC | Age: 32
End: 2017-06-30

## 2017-06-30 DIAGNOSIS — R11.0 NAUSEA: ICD-10-CM

## 2017-06-30 DIAGNOSIS — R11.0 NAUSEA: Primary | ICD-10-CM

## 2017-06-30 RX ORDER — ONDANSETRON 4 MG/1
4 TABLET, FILM COATED ORAL EVERY 6 HOURS PRN
Qty: 20 TABLET | Refills: 1 | Status: SHIPPED | OUTPATIENT
Start: 2017-06-30 | End: 2017-06-30

## 2017-06-30 RX ORDER — ONDANSETRON 4 MG/1
4 TABLET, FILM COATED ORAL EVERY 6 HOURS PRN
Qty: 20 TABLET | Refills: 1 | Status: SHIPPED | OUTPATIENT
Start: 2017-06-30 | End: 2017-07-24

## 2017-06-30 NOTE — TELEPHONE ENCOUNTER
I called and LM.  Recommend remaining off Buspar.  For now, I would not add a new agent until nausea sx improve.  Zofran rx sent in for nausea.    If nausea does not improve over the next few days should schedule a visit or UC evaluation.    If anxiety sx persist, call back next week. Consider starting citalopram 10 mg QD.

## 2017-06-30 NOTE — TELEPHONE ENCOUNTER
Pt stopped Buspar a few days ago, but still experiencing lingering feeling of nausea.  He is wondering if he can get a prescription for something for nausea.

## 2017-06-30 NOTE — TELEPHONE ENCOUNTER
Pt left town before getting med at [facility], requests sent to [facility] in [place].  Done.

## 2017-07-24 ENCOUNTER — OFFICE VISIT (OUTPATIENT)
Dept: PEDIATRICS | Facility: CLINIC | Age: 32
End: 2017-07-24
Payer: COMMERCIAL

## 2017-07-24 VITALS
TEMPERATURE: 98.2 F | HEART RATE: 87 BPM | WEIGHT: 150 LBS | SYSTOLIC BLOOD PRESSURE: 104 MMHG | DIASTOLIC BLOOD PRESSURE: 70 MMHG | BODY MASS INDEX: 22.48 KG/M2 | OXYGEN SATURATION: 97 %

## 2017-07-24 DIAGNOSIS — M62.838 NECK MUSCLE SPASM: ICD-10-CM

## 2017-07-24 DIAGNOSIS — F98.8 ATTENTION DEFICIT DISORDER (ADD) WITHOUT HYPERACTIVITY: Primary | ICD-10-CM

## 2017-07-24 DIAGNOSIS — F43.23 ADJUSTMENT DISORDER WITH MIXED ANXIETY AND DEPRESSED MOOD: ICD-10-CM

## 2017-07-24 DIAGNOSIS — Z11.3 SCREEN FOR STD (SEXUALLY TRANSMITTED DISEASE): ICD-10-CM

## 2017-07-24 PROCEDURE — 87491 CHLMYD TRACH DNA AMP PROBE: CPT | Performed by: INTERNAL MEDICINE

## 2017-07-24 PROCEDURE — 99214 OFFICE O/P EST MOD 30 MIN: CPT | Performed by: INTERNAL MEDICINE

## 2017-07-24 PROCEDURE — 87591 N.GONORRHOEAE DNA AMP PROB: CPT | Performed by: INTERNAL MEDICINE

## 2017-07-24 RX ORDER — DEXTROAMPHETAMINE SACCHARATE, AMPHETAMINE ASPARTATE, DEXTROAMPHETAMINE SULFATE AND AMPHETAMINE SULFATE 2.5; 2.5; 2.5; 2.5 MG/1; MG/1; MG/1; MG/1
10 TABLET ORAL DAILY
Qty: 30 TABLET | Refills: 0 | Status: SHIPPED | OUTPATIENT
Start: 2017-07-24 | End: 2017-09-26

## 2017-07-24 RX ORDER — DEXTROAMPHETAMINE SACCHARATE, AMPHETAMINE ASPARTATE MONOHYDRATE, DEXTROAMPHETAMINE SULFATE AND AMPHETAMINE SULFATE 5; 5; 5; 5 MG/1; MG/1; MG/1; MG/1
20 CAPSULE, EXTENDED RELEASE ORAL DAILY
Qty: 30 CAPSULE | Refills: 0 | Status: SHIPPED | OUTPATIENT
Start: 2017-07-24 | End: 2017-09-26

## 2017-07-24 ASSESSMENT — ANXIETY QUESTIONNAIRES
GAD7 TOTAL SCORE: 12
1. FEELING NERVOUS, ANXIOUS, OR ON EDGE: SEVERAL DAYS
7. FEELING AFRAID AS IF SOMETHING AWFUL MIGHT HAPPEN: SEVERAL DAYS
6. BECOMING EASILY ANNOYED OR IRRITABLE: NEARLY EVERY DAY
3. WORRYING TOO MUCH ABOUT DIFFERENT THINGS: SEVERAL DAYS
2. NOT BEING ABLE TO STOP OR CONTROL WORRYING: NEARLY EVERY DAY
5. BEING SO RESTLESS THAT IT IS HARD TO SIT STILL: SEVERAL DAYS

## 2017-07-24 ASSESSMENT — PATIENT HEALTH QUESTIONNAIRE - PHQ9: 5. POOR APPETITE OR OVEREATING: MORE THAN HALF THE DAYS

## 2017-07-24 NOTE — MR AVS SNAPSHOT
After Visit Summary   7/24/2017    Zeferino Reyna    MRN: [id number]           Patient Information     Date Of Birth          1985        Visit Information        Provider Department      7/24/2017 2:00 PM Ethan Adams MD [facility]        Today's Diagnoses     Attention deficit disorder (ADD) without hyperactivity    -  1    Neck muscle spasm        Adjustment disorder with mixed anxiety and depressed mood        Screen for STD (sexually transmitted disease)          Care Instructions    Check labwork today   I will contact you with the results    [facility] ([facility]) will contact you to schedule physical therapy    Call when you need medication refills          Follow-ups after your visit        Additional Services     [facility] PT, HAND, AND CHIROPRACTIC REFERRAL       **This order will print in the [facility] Scheduling Office**    Physical Therapy, Hand Therapy and Chiropractic Care are available through:    *[facility]  *[facility]  *[facility]    Call one number to schedule at any of the above locations: [phone].    Your provider has referred you to: Physical Therapy at [facility] or [facility]    Indication/Reason for Referral: Neck Pain  Onset of Illness: months  Therapy Orders: Evaluate and Treat  Special Programs: None  Special Request: None    Kaur Dietrich      Additional Comments for the Therapist or Chiropractor:       Please be aware that coverage of these services is subject to the terms and limitations of your health insurance plan.  Call member services at your health plan with any benefit or coverage questions.      Please bring the following to your appointment:    *Your personal calendar for scheduling future appointments  *Comfortable clothing                  Who to contact     If you have questions or need follow up information about today's clinic visit or your schedule please contact [facility]  "[facility] directly at [phone].  Normal or non-critical lab and imaging results will be communicated to you by [employer], letter or phone within 4 business days after the clinic has received the results. If you do not hear from us within 7 days, please contact the clinic through [employer] or phone. If you have a critical or abnormal lab result, we will notify you by phone as soon as possible.  Submit refill requests through [employer] or call your pharmacy and they will forward the refill request to us. Please allow 3 business days for your refill to be completed.          Additional Information About Your Visit        [employer] Information     [employer] lets you send messages to your doctor, view your test results, renew your prescriptions, schedule appointments and more. To sign up, go to [email or web address] . Click on \"Log in\" on the left side of the screen, which will take you to the Welcome page. Then click on \"Sign up Now\" on the right side of the page.     You will be asked to enter the access code listed below, as well as some personal information. Please follow the directions to create your username and password.     Your access code is: [email or web address]  Expires: 9/3/2017  2:03 PM     Your access code will  in 90 days. If you need help or a new code, please call your [facility] or [phone].        Care EveryWhere ID     This is your Care EveryWhere ID. This could be used by other organizations to access your [facility] medical records  [id number]        Your Vitals Were     Pulse Temperature Pulse Oximetry BMI (Body Mass Index)          87 98.2  F (36.8  C) (Oral) 97% 22.48 kg/m2         Blood Pressure from Last 3 Encounters:   17 104/70   17 102/56   17 98/70    Weight from Last 3 Encounters:   17 150 lb (68 kg)   17 148 lb (67.1 kg)   17 146 lb (66.2 kg)              We Performed the Following     CHLAMYDIA TRACHOMATIS PCR     [facility] HAND, [facility] " REFERRAL     NEISSERIA GONORRHOEA PCR          Today's Medication Changes          These changes are accurate as of: 7/24/17  2:34 PM.  If you have any questions, ask your nurse or doctor.               These medicines have changed or have updated prescriptions.        Dose/Directions    * amphetamine-dextroamphetamine 30 MG per 24 hr capsule   Commonly known as:  ADDERALL XR   This may have changed:    - how much to take  - additional instructions   Used for:  Attention deficit disorder of adult   Changed by:  Lukas Gonzalez MD        Dose:  30 mg   Take 1 capsule by mouth daily. Along with 10 mg tables   Quantity:  30 capsule   Refills:  0       * amphetamine-dextroamphetamine 10 MG per tablet   Commonly known as:  ADDERALL   This may have changed:    - Another medication with the same name was added. Make sure you understand how and when to take each.  - Another medication with the same name was changed. Make sure you understand how and when to take each.   Used for:  Attention deficit disorder of adult   Changed by:  Lukas Gonzalez MD        Dose:  10 mg   Take 1 tablet by mouth daily.   Quantity:  30 tablet   Refills:  0       * amphetamine-dextroamphetamine 20 MG per 24 hr capsule   Commonly known as:  ADDERALL XR   This may have changed:  You were already taking a medication with the same name, and this prescription was added. Make sure you understand how and when to take each.   Used for:  Attention deficit disorder (ADD) without hyperactivity   Changed by:  Ethan Adams MD        Dose:  20 mg   Take 1 capsule (20 mg) by mouth daily   Quantity:  30 capsule   Refills:  0       * amphetamine-dextroamphetamine 10 MG per tablet   Commonly known as:  ADDERALL   This may have changed:  You were already taking a medication with the same name, and this prescription was added. Make sure you understand how and when to take each.   Used for:  Attention deficit disorder (ADD) without hyperactivity   Changed  by:  Ethan Adams MD        Dose:  10 mg   Take 1 tablet (10 mg) by mouth daily   Quantity:  30 tablet   Refills:  0       * Notice:  This list has 4 medication(s) that are the same as other medications prescribed for you. Read the directions carefully, and ask your doctor or other care provider to review them with you.         Where to get your medicines      Some of these will need a paper prescription and others can be bought over the counter.  Ask your nurse if you have questions.     Bring a paper prescription for each of these medications     amphetamine-dextroamphetamine 10 MG per tablet    amphetamine-dextroamphetamine 20 MG per 24 hr capsule                Primary Care Provider Office Phone # Fax #    Ethan Adams [phone]       [facility] [address] [place] [address] [address] [place] [address]        Equal Access to Services     VITA DAVILA AH: Hadii aad ku hadasho Sobetty, waaxda luqadaha, qaybta kaalmada adeegyada, andrey roth . So [facility] [phone].    ATENCIÓN: Si habla español, tiene a rizvi disposición servicios gratuitos de asistencia lingüística. Llame al [phone].    We comply with applicable federal civil rights laws and [place] laws. We do not discriminate on the basis of race, color, national origin, age, disability sex, sexual orientation or gender identity.            Thank you!     Thank you for choosing [facility]  for your care. Our goal is always to provide you with excellent care. Hearing back from our patients is one way we can continue to improve our services. Please take a few minutes to complete the written survey that you may receive in the mail after your visit with us. Thank you!             Your Updated Medication List - Protect others around you: Learn how to safely use, store and throw away your medicines at www.disposemymeds.org.          This list is accurate as of: 7/24/17  2:34 PM.  Always use your most  recent med list.                   Brand Name Dispense Instructions for use Diagnosis    ALPRAZolam 0.5 MG tablet    XANAX    30 tablet    Take 1 tablet (0.5 mg) by mouth 3 times daily as needed for anxiety    Adjustment disorder with mixed anxiety and depressed mood       * amphetamine-dextroamphetamine 30 MG per 24 hr capsule    ADDERALL XR    30 capsule    Take 1 capsule by mouth daily. Along with 10 mg tables    Attention deficit disorder of adult       * amphetamine-dextroamphetamine 10 MG per tablet    ADDERALL    30 tablet    Take 1 tablet by mouth daily.    Attention deficit disorder of adult       * amphetamine-dextroamphetamine 20 MG per 24 hr capsule    ADDERALL XR    30 capsule    Take 1 capsule (20 mg) by mouth daily    Attention deficit disorder (ADD) without hyperactivity       * amphetamine-dextroamphetamine 10 MG per tablet    ADDERALL    30 tablet    Take 1 tablet (10 mg) by mouth daily    Attention deficit disorder (ADD) without hyperactivity       MULTIVITAMIN PO      Take 1 tablet by mouth daily        OMEGA-3 FISH OIL PO      Take 1 tablet by mouth daily        VITAMIN C PO      Take 1 tablet by mouth daily        * Notice:  This list has 4 medication(s) that are the same as other medications prescribed for you. Read the directions carefully, and ask your doctor or other care provider to review them with you.

## 2017-07-24 NOTE — PROGRESS NOTES
SUBJECTIVE:                                                    Zeferino Reyna is a 31 year old male who presents to clinic today for the following health issues:    Was treated for anxiety, started Buspar last month.  Did not tolerate- nausea  Stopped taking.  Is taking alprazolam - taking typically in the evening 2-3 times per week.  Has tried and not done well w/ Lexapro and other medications in the past.    Has been feeling more anxious and depressed the past few days.  Reviewed several options for medications, he is not currently interested in a daily controller medication for his anxiety or depression.       Followup of ADD    Taking Medication as prescribed: NO - pt has been out for 3+ months.     Was getting rx from outside clinics.     Side Effects:  None    Medication Helping Symptoms:  not applicable    Pt relates he was taking Adderall XR 20 mg am, and then Adderall 10 mg in the afternoon.    Review of his chart shows that he had these rx last from our clinic in 2012. At that time was 30 mg XR with 10 mg regular Adderall.      Also would like routine screening for STD's.   No hx of STD.   Concern about a recent contact.  No current sx of dysuria, hematuria, rashes    Neck muscle spasm. Ongoing for the past few days. No trauma recalled.  Discomfort w/ ROM of the neck.     Problem list and histories reviewed & adjusted, as indicated.      Reviewed and updated as needed this visit by clinical staff  Tobacco  Allergies  Meds  Med Hx  Surg Hx  Fam Hx  Soc Hx      Reviewed and updated as needed this visit by Provider  Tobacco  Allergies  Meds  Problems  Med Hx  Surg Hx  Fam Hx  Soc Hx          ROS:  Constitutional, HEENT, cardiovascular, pulmonary, gi and gu systems are negative, except as otherwise noted.      OBJECTIVE:   /70 (Cuff Size: Adult Regular)  Pulse 87  Temp 98.2  F (36.8  C) (Oral)  Wt 150 lb (68 kg)  SpO2 97%  BMI 22.48 kg/m2  Body mass index is 22.48 kg/(m^2).  GEN: No  distress. Anxious.  SKIN: No rashes  HEENT: PERRL. EOMI. No nasal d/c. OP moist.   NECK: Supple. No LAD or TM. Discomfort w/ palpation over the mid to upper paraspinal muscles.   LUNGS: Clear to auscultation bilaterally. No rhonchi, rales, wheezes or retractions.  CV: Regular rate and rhythm.  No murmurs, rubs or gallops. Pulses 2+ radial.  EXTR: No edema  PSYCH: Anxious. Well groomed. Good eye contact.   NEURO: No focal deficits in the UE noted.    ASSESSMENT/PLAN:       ICD-10-CM    1. Attention deficit disorder (ADD) without hyperactivity F98.8 amphetamine-dextroamphetamine (ADDERALL XR) 20 MG per 24 hr capsule     amphetamine-dextroamphetamine (ADDERALL) 10 MG per tablet   2. Neck muscle spasm M62.838 [facility], HAND, AND CHIROPRACTIC REFERRAL   3. Adjustment disorder with mixed anxiety and depressed mood F43.23    4. Screen for STD (sexually transmitted disease) Z11.3 NEISSERIA GONORRHOEA PCR     CHLAMYDIA TRACHOMATIS PCR     Adjustment disorder w/ anxiety and depression. Per pt is better than at his last OV, but is still having ongoing sx.  Encouraged counseling.  Discussed alternate controller medications, will consider.      Patient Instructions   Check labwork today   I will contact you with the results    [facility] ([facility]) will contact you to schedule physical therapy    Call when you need medication refills    Ethan Adams MD  [facility]

## 2017-07-24 NOTE — LETTER
[facility]  [address] [facility] [address]                  [phone]   July 27, 2017    Zeferino Reyna  [address] [facility] [address]      Zeferino:    Your STD evaluation is complete and all results are normal.    - Chlamydia testing is normal  - Gonorrea testing is normal    Please feel free to contact me if you have any questions or concerns.      Ethan Adams MD      Results for orders placed or performed in visit on 07/24/17   NEISSERIA GONORRHOEA PCR   Result Value Ref Range    Specimen Descrip Urine     N Gonorrhea PCR  NEG     Negative   Negative for N. gonorrhoeae rRNA by transcription mediated amplification.   A negative result by transcription mediated amplification does not preclude the   presence of N. gonorrhoeae infection because results are dependent on proper   and adequate collection, absence of inhibitors, and sufficient rRNA to be   detected.     CHLAMYDIA TRACHOMATIS PCR   Result Value Ref Range    Specimen Description Urine     Chlamydia Trachomatis PCR  NEG     Negative   Negative for C. trachomatis rRNA by transcription mediated amplification.   A negative result by transcription mediated amplification does not preclude the   presence of C. trachomatis infection because results are dependent on proper   and adequate collection, absence of inhibitors, and sufficient rRNA to be   detected.

## 2017-07-24 NOTE — PATIENT INSTRUCTIONS
Check labwork today   I will contact you with the results    [facility] ([facility]) will contact you to schedule physical therapy    Call when you need medication refills

## 2017-07-24 NOTE — NURSING NOTE
"Chief Complaint   Patient presents with     Recheck Medication       Initial /70 (Cuff Size: Adult Regular)  Pulse 87  Temp 98.2  F (36.8  C) (Oral)  Wt 150 lb (68 kg)  SpO2 97%  BMI 22.48 kg/m2 Estimated body mass index is 22.48 kg/(m^2) as calculated from the following:    Height as of 6/26/17: 5' 8.5\" (1.74 m).    Weight as of this encounter: 150 lb (68 kg).  Medication Reconciliation: felicia Alexandre   "

## 2017-07-25 ASSESSMENT — ANXIETY QUESTIONNAIRES: GAD7 TOTAL SCORE: 12

## 2017-07-25 ASSESSMENT — PATIENT HEALTH QUESTIONNAIRE - PHQ9: SUM OF ALL RESPONSES TO PHQ QUESTIONS 1-9: 18

## 2017-07-26 LAB
C TRACH DNA SPEC QL NAA+PROBE: NORMAL
N GONORRHOEA DNA SPEC QL NAA+PROBE: NORMAL
SPECIMEN SOURCE: NORMAL
SPECIMEN SOURCE: NORMAL

## 2017-08-09 ENCOUNTER — VIRTUAL VISIT (OUTPATIENT)
Dept: PEDIATRICS | Facility: CLINIC | Age: 32
End: 2017-08-09
Payer: COMMERCIAL

## 2017-08-09 ENCOUNTER — TELEPHONE (OUTPATIENT)
Dept: PEDIATRICS | Facility: CLINIC | Age: 32
End: 2017-08-09

## 2017-08-09 DIAGNOSIS — G25.81 RESTLESS LEG SYNDROME: ICD-10-CM

## 2017-08-09 DIAGNOSIS — F51.01 PRIMARY INSOMNIA: Primary | ICD-10-CM

## 2017-08-09 PROCEDURE — 99441 ZZC PHYSICIAN TELEPHONE EVALUATION 5-10 MIN: CPT | Performed by: INTERNAL MEDICINE

## 2017-08-09 RX ORDER — GABAPENTIN 300 MG/1
300 CAPSULE ORAL AT BEDTIME
Qty: 30 CAPSULE | Refills: 1 | Status: SHIPPED | OUTPATIENT
Start: 2017-08-09 | End: 2017-11-03

## 2017-08-09 NOTE — PROGRESS NOTES
Telephone visit  CC: Shayne    HPI:  Recently dx w/ anxiety sx.    Has been taking alprazolam about once per week.  Has not yet started Adderall.    Takes melatonin - 10 mg.  Has been taking for an extended time.    Has tried Nyquil - no change.    Has not been rx other sleep medications.    Has noted that legs feel hot at nighttime. Will drink cold water to help his legs feel better.  Does not necessarily move his legs, but does take up to 1 hour for his legs to feel normal.    Discussed medication options.    Will try gabapentin 300 mg QHS. Take for at least 1 week. The dose could be increased to 600 mg QHS if needed. Call if needs a dose increase.  If gabapentin is not effective, consider changing to trazodone  mg QHS.     Total phone time: 9 minutes 40 seconds.

## 2017-08-09 NOTE — TELEPHONE ENCOUNTER
Patient calls.  He was seen on 7/24 and taking xanax for anxiety and Adderall for ADD.  Has had trouble sleeping in the last few weeks.  Trouble with falling and staying asleep.  States he can eventually fall asleep and then is awake about an hour later, falls asleep again and up again every hour.  Gets about 4-5 hours of sleep per night total.   Feels tired at daytime as a result.  Does not think that xanax is helping with sleep.  Has tried melatonin in the last few months with no good results.      Please advise-patient would rather not schedule another appointment-ok for a telephone visit for this?  Ok to call patient back and leave a detailed message.    Alba Soliman, RN  Message handled by Nurse Triage.

## 2017-08-09 NOTE — TELEPHONE ENCOUNTER
Called and spoke with pt. Scheduled phone visit for end of day today.  ERIK Hyman August 9, 2017 1:16 PM

## 2017-08-09 NOTE — TELEPHONE ENCOUNTER
Please call:  Since the insomnia is a new issue it would be best to set up a phone visit to discuss options to help manage his symptoms.  I could add him at the end of the schedule today or at lunch / end of day on Friday.

## 2017-09-26 DIAGNOSIS — F98.8 ATTENTION DEFICIT DISORDER (ADD) WITHOUT HYPERACTIVITY: ICD-10-CM

## 2017-09-26 RX ORDER — DEXTROAMPHETAMINE SACCHARATE, AMPHETAMINE ASPARTATE MONOHYDRATE, DEXTROAMPHETAMINE SULFATE AND AMPHETAMINE SULFATE 5; 5; 5; 5 MG/1; MG/1; MG/1; MG/1
20 CAPSULE, EXTENDED RELEASE ORAL DAILY
Qty: 30 CAPSULE | Refills: 0 | Status: SHIPPED | OUTPATIENT
Start: 2017-09-26 | End: 2019-06-21

## 2017-09-26 RX ORDER — DEXTROAMPHETAMINE SACCHARATE, AMPHETAMINE ASPARTATE, DEXTROAMPHETAMINE SULFATE AND AMPHETAMINE SULFATE 2.5; 2.5; 2.5; 2.5 MG/1; MG/1; MG/1; MG/1
10 TABLET ORAL DAILY
Qty: 30 TABLET | Refills: 0 | Status: SHIPPED | OUTPATIENT
Start: 2017-09-26 | End: 2019-06-21 | Stop reason: ALTCHOICE

## 2017-09-26 NOTE — TELEPHONE ENCOUNTER
Patient calling requesting refills and he would like to  the scripts at the  when ready.    Adderall XR  Last Written Prescription Date:  7/24/17  Last Fill Quantity: 30,   # refills: 0  Last Office Visit with [facility], [facility] or  Health prescribing provider: 7/24/17  Future Office visit:       Routing refill request to provider for review/approval because:  Drug not on the [facility], P or  Health refill protocol or controlled substance    Adderall 10mg  Last Written Prescription Date:  7/24/17  Last Fill Quantity: 30,   # refills: 0  Last Office Visit with [facility], [facility] or  Health prescribing provider: 7/24/17  Future Office visit:       Routing refill request to provider for review/approval because:  Drug not on the [facility], P or  Health refill protocol or controlled substance

## 2017-10-03 ENCOUNTER — OFFICE VISIT (OUTPATIENT)
Dept: PEDIATRICS | Facility: CLINIC | Age: 32
End: 2017-10-03
Payer: COMMERCIAL

## 2017-10-03 VITALS
BODY MASS INDEX: 22.93 KG/M2 | DIASTOLIC BLOOD PRESSURE: 54 MMHG | OXYGEN SATURATION: 100 % | HEART RATE: 91 BPM | SYSTOLIC BLOOD PRESSURE: 108 MMHG | TEMPERATURE: 97.5 F | WEIGHT: 153 LBS

## 2017-10-03 DIAGNOSIS — F32.1 MODERATE MAJOR DEPRESSION (H): Primary | ICD-10-CM

## 2017-10-03 DIAGNOSIS — M77.8 TENDINITIS OF FINGER: ICD-10-CM

## 2017-10-03 PROCEDURE — 99214 OFFICE O/P EST MOD 30 MIN: CPT | Performed by: INTERNAL MEDICINE

## 2017-10-03 RX ORDER — VENLAFAXINE HYDROCHLORIDE 37.5 MG/1
37.5 CAPSULE, EXTENDED RELEASE ORAL DAILY
Qty: 45 CAPSULE | Refills: 1 | Status: SHIPPED | OUTPATIENT
Start: 2017-10-03 | End: 2017-11-07 | Stop reason: DRUGHIGH

## 2017-10-03 ASSESSMENT — PATIENT HEALTH QUESTIONNAIRE - PHQ9: SUM OF ALL RESPONSES TO PHQ QUESTIONS 1-9: 20

## 2017-10-03 NOTE — MR AVS SNAPSHOT
After Visit Summary   10/3/2017    Zeferino Reyna    MRN: [id number]           Patient Information     Date Of Birth          1985        Visit Information        Provider Department      10/3/2017 9:20 AM [facility]        Today's Diagnoses     Moderate major depression (H)    -  1    Tendinitis of finger          Care Instructions    Effexor 37.5 mg capsules   Start with 1 per day. After 1-2 weeks, you can increase to 2 capsules once per day    Call to set up counseling visits.    Counseling clinic options:    Edin Houser  [facility]   [phone]    [facility] Services (BHSI)  [facility]  [address], [address]  [place] [address] [phone]     [facility]  [address] [place] [address] [phone]    [facility]  [address]  [address]  [place] [address] [phone]             Follow-ups after your visit        Additional Services     ORTHOPEDICS ADULT REFERRAL       Your provider has referred you to: [facility] [place] [phone]   [email or web address]    Please be aware that coverage of these services is subject to the terms and limitations of your health insurance plan.  Call member services at your health plan with any benefit or coverage questions.      Please bring the following to your appointment:    >>   Any x-rays, CTs or MRIs which have been performed.  Contact the facility where they were done to arrange for  prior to your scheduled appointment.    >>   List of current medications   >>   This referral request   >>   Any documents/labs given to you for this referral                  Your next 10 appointments already scheduled     Nov 01, 2017  9:20 AM CDT   [facility] [place] [facility]    [address]  [address]  [place] [address]  "  [phone]           Bring a current list of meds and any records pertaining to this visit. For Physicals, please bring immunization records and any forms needing to be filled out. Please arrive 10 minutes early to complete paperwork.              Who to contact     If you have questions or need follow up information about today's clinic visit or your schedule please contact [facility] directly at [phone].  Normal or non-critical lab and imaging results will be communicated to you by wireLawyer[employer], letter or phone within 4 business days after the clinic has received the results. If you do not hear from us within 7 days, please contact the clinic through wireLawyer[employer] or phone. If you have a critical or abnormal lab result, we will notify you by phone as soon as possible.  Submit refill requests through [employer] or call your pharmacy and they will forward the refill request to us. Please allow 3 business days for your refill to be completed.          Additional Information About Your Visit        [employer] Information     [employer] lets you send messages to your doctor, view your test results, renew your prescriptions, schedule appointments and more. To sign up, go to [email or web address] . Click on \"Log in\" on the left side of the screen, which will take you to the Welcome page. Then click on \"Sign up Now\" on the right side of the page.     You will be asked to enter the access code listed below, as well as some personal information. Please follow the directions to create your username and password.     Your access code is: [email or web address]  Expires: 2018  9:45 AM     Your access code will  in 90 days. If you need help or a new code, please call your [place] clinic or [phone].        Care EveryWhere ID     This is your Care EveryWhere ID. This could be used by other organizations to access your [facility] medical records  [id number]        Your Vitals Were     Pulse Temperature Pulse Oximetry BMI " (Body Mass Index)          91 97.5  F (36.4  C) (Oral) 100% 22.93 kg/m2         Blood Pressure from Last 3 Encounters:   10/03/17 108/54   07/24/17 104/70   06/26/17 102/56    Weight from Last 3 Encounters:   10/03/17 153 lb (69.4 kg)   07/24/17 150 lb (68 kg)   06/26/17 148 lb (67.1 kg)              We Performed the Following     ORTHOPEDICS ADULT REFERRAL          Today's Medication Changes          These changes are accurate as of: 10/3/17  9:45 AM.  If you have any questions, ask your nurse or doctor.               Start taking these medicines.        Dose/Directions    venlafaxine 37.5 MG 24 hr capsule   Commonly known as:  EFFEXOR-XR   Used for:  Moderate major depression (H)   Started by:  Ethan Adams MD        Dose:  37.5 mg   Take 1 capsule (37.5 mg) by mouth daily After 2 weeks, increase to 2 capsules daily.   Quantity:  45 capsule   Refills:  1            Where to get your medicines      These medications were sent to [facility] [place] [facility] [place] - [address]   [address], [place] [address]     Phone:  [phone]     venlafaxine 37.5 MG 24 hr capsule                Primary Care Provider Office Phone # Fax #    Ethan Adams [phone]       [address] [place] [address]        Equal Access to Services     [facility] AH: Hadii reema lara hadasho Sobetty, waaxda luqadaha, qaybta kaalmada adeegyada, andrey roth . So [facility] [phone].    ATENCIÓN: Si habla español, tiene a rizvi disposición servicios gratuitos de asistencia lingüística. Llame al [phone].    We comply with applicable federal civil rights laws and [place] laws. We do not discriminate on the basis of race, color, national origin, age, disability, sex, sexual orientation, or gender identity.            Thank you!     [facility]  [facility] your [facility]. Our goal is always to provide you with excellent  care. Hearing back from our patients is one way we can continue to improve our services. Please take a few minutes to complete the written survey that you may receive in the mail after your visit with us. Thank you!             Your Updated Medication List - Protect others around you: Learn how to safely use, store and throw away your medicines at www.disposemymeds.org.          This list is accurate as of: 10/3/17  9:45 AM.  Always use your most recent med list.                   Brand Name Dispense Instructions for use Diagnosis    ALPRAZolam 0.5 MG tablet    XANAX    30 tablet    Take 1 tablet (0.5 mg) by mouth 3 times daily as needed for anxiety    Adjustment disorder with mixed anxiety and depressed mood       * amphetamine-dextroamphetamine 20 MG per 24 hr capsule    ADDERALL XR    30 capsule    Take 1 capsule (20 mg) by mouth daily    Attention deficit disorder (ADD) without hyperactivity       * amphetamine-dextroamphetamine 10 MG per tablet    ADDERALL    30 tablet    Take 1 tablet (10 mg) by mouth daily    Attention deficit disorder (ADD) without hyperactivity       gabapentin 300 MG capsule    NEURONTIN    30 capsule    Take 1 capsule (300 mg) by mouth At Bedtime    Primary insomnia, Restless leg syndrome       MULTIVITAMIN PO      Take 1 tablet by mouth daily        OMEGA-3 FISH OIL PO      Take 1 tablet by mouth daily        venlafaxine 37.5 MG 24 hr capsule    EFFEXOR-XR    45 capsule    Take 1 capsule (37.5 mg) by mouth daily After 2 weeks, increase to 2 capsules daily.    Moderate major depression (H)       VITAMIN C PO      Take 1 tablet by mouth daily        * Notice:  This list has 2 medication(s) that are the same as other medications prescribed for you. Read the directions carefully, and ask your doctor or other care provider to review them with you.

## 2017-10-03 NOTE — NURSING NOTE
"Chief Complaint   Patient presents with     Depression       Initial /54 (Cuff Size: Adult Regular)  Pulse 91  Temp 97.5  F (36.4  C) (Oral)  Wt 153 lb (69.4 kg)  SpO2 100%  BMI 22.93 kg/m2 Estimated body mass index is 22.93 kg/(m^2) as calculated from the following:    Height as of 6/26/17: 5' 8.5\" (1.74 m).    Weight as of this encounter: 153 lb (69.4 kg).  Medication Reconciliation: complete    Clarisse Alexandre   "

## 2017-10-03 NOTE — PROGRESS NOTES
SUBJECTIVE:   Zeferino Reyna is a 32 year old male who presents to clinic today for the following health issues:      Depression Followup    Status since last visit: Worsened     See PHQ-9 for current symptoms.  Other associated symptoms: None    Complicating factors:   Significant life event:  Yes-  Same as last time.    Current substance abuse:  None  Anxiety or Panic symptoms:  No    PHQ-9 Score and MyChart F/U Questions 6/5/2017 7/24/2017 10/3/2017   Total Score 19 18 20   Q9: Suicide Ideation Several days Several days More than half the days     In the past two weeks have you had thoughts of suicide or self-harm?  Yes  In the past two weeks have you thought of a plan or intent to harm yourself? No.  Do you have concerns about your personal safety or the safety of others?   No    Has been struggling with his mood over the past several months.  Main trigger was a break up with his former girlfriend.  Was having more anxiety sx earlier in the summer.  Tried Buspar, stopped d/t side effects.    Was involved in counseling, but he stopped d/t not feeling like he was getting benefit.  Had made calls to set up w/ a new clinic but has not yet been able to schedule.    Has increased sx, see PHQ-9.  Has SI but no plan currently. Is willing to seek help if his sx progress.    Had been rx Lexapro while in high school - recalls that this did improve lows but also blunted highs.   Discussed options for other medications.    Also, has a lump on the left hand near / on the 5th finger tendon. No locking of the finger. Is painful at times especially with weight lifting.       Problem list and histories reviewed & adjusted, as indicated.    Labs reviewed in EPIC    Reviewed and updated as needed this visit by clinical staffTobacco  Allergies  Meds  Med Hx  Surg Hx  Fam Hx  Soc Hx      Reviewed and updated as needed this visit by Provider  Tobacco  Allergies  Meds  Problems  Med Hx  Surg Hx  Fam Hx  Soc Hx           ROS:  Constitutional, HEENT, cardiovascular, pulmonary, gi and gu systems are negative, except as otherwise noted.      OBJECTIVE:   /54 (Cuff Size: Adult Regular)  Pulse 91  Temp 97.5  F (36.4  C) (Oral)  Wt 153 lb (69.4 kg)  SpO2 100%  BMI 22.93 kg/m2  Body mass index is 22.93 kg/(m^2).  GEN : No distress  SKIN: No rashes  M/S: Left hand with nodule over the distal 5th metacarpal on the flexor tendon. FROM of the finger. No joint effusions.  PSYCH: Depressed affect. Well groomed. Overall good eye contact.     ASSESSMENT/PLAN:       ICD-10-CM    1. Moderate major depression (H) F32.1 venlafaxine (EFFEXOR-XR) 37.5 MG 24 hr capsule   2. Tendinitis of finger M77.9 ORTHOPEDICS ADULT REFERRAL     Increasing depression sx. Discussed options.   Will start Effexor, begin with low dose and increase as tolerated.  Highly recommend counseling - clinic options and phone numbers given. Call if has difficulty scheduling an appointment.  Scheduled f/u visit in 4 weeks. Discussed that he can RTC sooner if his mood warrants.     Tendinitis vs cyst vs scarring of the left hand 5th finger flexor tendon. Recommend hand evaluation.     Ethan Adams MD  [facility]

## 2017-10-03 NOTE — PATIENT INSTRUCTIONS
Effexor 37.5 mg capsules   Start with 1 per day. After 1-2 weeks, you can increase to 2 capsules once per day    Call to set up counseling visits.    Counseling clinic options:    Edin Houser  [facility]   [phone]    Behavioral Health Services (BHSI)  [facility]  [address] [place] [address], [address]  [facility] [id number] [phone]     [facility]  [address] [id number] [phone]    [facility]  [address] [place] [address]  [address]  [place], [place] [id number] [phone]

## 2017-10-10 ENCOUNTER — TELEPHONE (OUTPATIENT)
Dept: PEDIATRICS | Facility: CLINIC | Age: 32
End: 2017-10-10

## 2017-10-10 NOTE — TELEPHONE ENCOUNTER
Patient needs a letter for FMLA saying it is ok to return to work. No restrictions. Please mail to the patient. Address confirmed.  Letter pended for signature if appropriate.   Jazmine King RN

## 2017-10-10 NOTE — LETTER
October 10, 2017        Zeferino Reyna  [address] [place] [address]      RE: Zeferino Reyna            To whom it may concern:    Mr. Reyna is under my care for his medical needs. He may return to work 10/11/17 without restrictions    Please contact me for questions or concerns.          Sincerely,        Ethan Adams MD

## 2017-10-27 ENCOUNTER — TELEPHONE (OUTPATIENT)
Dept: PEDIATRICS | Facility: CLINIC | Age: 32
End: 2017-10-27

## 2017-10-27 NOTE — TELEPHONE ENCOUNTER
Reason for Call:  Form, our goal is to have forms completed with 72 hours, however, some forms may require a visit or additional information.    Type of letter, form or note:  FMLA    Who is the form from?: Patient    Where did the form come from: Patient or family brought in       What clinic location was the form placed at?: [place]    Where the form was placed: 's Box    What number is listed as a contact on the form?: [phone]       Additional comments:     Call taken on 10/27/2017 at 1:34 PM by Radha Brar

## 2017-10-31 ENCOUNTER — TELEPHONE (OUTPATIENT)
Dept: PEDIATRICS | Facility: CLINIC | Age: 32
End: 2017-10-31

## 2017-10-31 NOTE — TELEPHONE ENCOUNTER
Kathi,  ([phone]) for patient's FMLA left a voicemail on my triage line last evening stating she has more questions.     Called back. Left voicemail for Kathi to call back the clinic.

## 2017-10-31 NOTE — TELEPHONE ENCOUNTER
I called and LMMiguel Ángel Zeferino has an appt w/ me tomorrow, but OK to return to work if he wants.

## 2017-10-31 NOTE — TELEPHONE ENCOUNTER
Kathi with [employer] with  question about the form is patient to rtw on 11/1 or 11/2 because it says through 11/1.but patient thought he was to rtw tomorrow.  Needs answer today. [phone]  Jazmine King RN

## 2017-11-03 DIAGNOSIS — G25.81 RESTLESS LEG SYNDROME: ICD-10-CM

## 2017-11-03 DIAGNOSIS — F51.01 PRIMARY INSOMNIA: ICD-10-CM

## 2017-11-03 RX ORDER — GABAPENTIN 300 MG/1
300 CAPSULE ORAL AT BEDTIME
Qty: 30 CAPSULE | Refills: 5 | Status: SHIPPED | OUTPATIENT
Start: 2017-11-03 | End: 2019-06-21

## 2017-11-03 NOTE — TELEPHONE ENCOUNTER
Pt calls.  He received gabapentin in the past for sleep.  It was very effective.  He is having anxiety and issues sleeping.  He would like a new prescription.      Gabapentin  LRF 8/9/17, dispense 30 with 1 refill  LOV 10/3/17    Routing refill request to provider for review/approval because:  Drug not on the [facility] refill protocol

## 2017-11-07 ENCOUNTER — OFFICE VISIT (OUTPATIENT)
Dept: PEDIATRICS | Facility: CLINIC | Age: 32
End: 2017-11-07
Payer: COMMERCIAL

## 2017-11-07 VITALS
WEIGHT: 148 LBS | TEMPERATURE: 98.2 F | HEART RATE: 89 BPM | DIASTOLIC BLOOD PRESSURE: 64 MMHG | OXYGEN SATURATION: 98 % | BODY MASS INDEX: 22.18 KG/M2 | SYSTOLIC BLOOD PRESSURE: 110 MMHG

## 2017-11-07 DIAGNOSIS — F32.1 MODERATE MAJOR DEPRESSION (H): Primary | ICD-10-CM

## 2017-11-07 DIAGNOSIS — F43.23 ADJUSTMENT DISORDER WITH MIXED ANXIETY AND DEPRESSED MOOD: ICD-10-CM

## 2017-11-07 PROCEDURE — 99213 OFFICE O/P EST LOW 20 MIN: CPT | Performed by: INTERNAL MEDICINE

## 2017-11-07 RX ORDER — VENLAFAXINE HYDROCHLORIDE 75 MG/1
75 CAPSULE, EXTENDED RELEASE ORAL DAILY
Qty: 90 CAPSULE | Refills: 0 | Status: SHIPPED | OUTPATIENT
Start: 2017-11-07 | End: 2017-11-27 | Stop reason: DRUGHIGH

## 2017-11-07 NOTE — PROGRESS NOTES
SUBJECTIVE:   Zeferino Reyna is a 32 year old male who presents to clinic today for the following health issues:      Depression and anxiety Followup - taking Effexor-XR    Taking Medication as prescribed: yes    Side Effects:  None    Medication Helping Symptoms:  Yes, depression is much better.      Taking 75 mg per day of Effexor currently.  No significant side effects.   Has not scheduled counseling visits.      Problem list and histories reviewed & adjusted, as indicated.      Reviewed and updated as needed this visit by clinical staffTobacco  Allergies  Med Hx  Surg Hx  Fam Hx  Soc Hx          OBJECTIVE:     /64 (Cuff Size: Adult Regular)  Pulse 89  Temp 98.2  F (36.8  C) (Oral)  Wt 148 lb (67.1 kg)  SpO2 98%  BMI 22.18 kg/m2  Body mass index is 22.18 kg/(m^2).  GEN: No distress  PSYCH: Normal affect. Well groomed. Good eye contact.     ASSESSMENT/PLAN:       ICD-10-CM    1. Moderate major depression (H) F32.1 venlafaxine (EFFEXOR-XR) 75 MG 24 hr capsule   2. Adjustment disorder with mixed anxiety and depressed mood F43.23 venlafaxine (EFFEXOR-XR) 75 MG 24 hr capsule     Patient Instructions   Continue with your current Effexor dose of 75 mg once per day   A prescription for 75 mg capsules was sent to your pharmacy    I recommend that you set up counseling visits, even if you continue to feel well    Next visit with me in about 3 months       Ethan Adams MD  [facility]

## 2017-11-07 NOTE — MR AVS SNAPSHOT
"              After Visit Summary   11/7/2017    Zeferino Reyna    MRN: [id number]           Patient Information     Date Of Birth          1985        Visit Information        Provider Department      11/7/2017 9:40 AM [facility]        Today's Diagnoses     Moderate major depression (H)    -  1    Adjustment disorder with mixed anxiety and depressed mood          Care Instructions    Continue with your current Effexor dose of 75 mg once per day   A prescription for 75 mg capsules was sent to your pharmacy    I recommend that you set up counseling visits, even if you continue to feel well    Next visit with me in about 3 months           Follow-ups after your visit        Who to contact     If you have questions or need follow up information about today's clinic visit or your schedule please contact [facility] directly at [phone].  Normal or non-critical lab and imaging results will be communicated to you by MyChart, letter or phone within 4 business days after the clinic has received the results. If you do not hear from us within 7 days, please contact the clinic through MyChart or phone. If you have a critical or abnormal lab result, we will notify you by phone as soon as possible.  Submit refill requests through [employer] or call your pharmacy and they will forward the refill request to us. Please allow 3 business days for your refill to be completed.          Additional Information About Your Visit        MyChart Information     [employer] lets you send messages to your doctor, view your test results, renew your prescriptions, schedule appointments and more. To sign up, go to [email or web address] . Click on \"Log in\" on the left side of the screen, which will take you to the Welcome page. Then click on \"Sign up Now\" on the right side of the page.     You will be asked to enter the access code listed below, as well as some personal information. Please follow the " directions to create your username and password.     Your access code is: [email or web address]  Expires: 2018  8:45 AM     Your access code will  in 90 days. If you need help or a new code, please call your [facility] clinic or [phone].        Care EveryWhere ID     This is your Care EveryWhere ID. This could be used by other organizations to access your [facility] medical records  [id number]        Your Vitals Were     Pulse Temperature Pulse Oximetry BMI (Body Mass Index)          89 98.2  F (36.8  C) (Oral) 98% 22.18 kg/m2         Blood Pressure from Last 3 Encounters:   17 110/64   10/03/17 108/54   17 104/70    Weight from Last 3 Encounters:   17 148 lb (67.1 kg)   10/03/17 153 lb (69.4 kg)   17 150 lb (68 kg)              Today, you had the following     No orders found for display         Today's Medication Changes          These changes are accurate as of: 17  9:58 AM.  If you have any questions, ask your nurse or doctor.               These medicines have changed or have updated prescriptions.        Dose/Directions    venlafaxine 75 MG 24 hr capsule   Commonly known as:  EFFEXOR-XR   This may have changed:    - medication strength  - how much to take  - additional instructions   Used for:  Moderate major depression (H), Adjustment disorder with mixed anxiety and depressed mood   Changed by:  Ethan Adams MD        Dose:  75 mg   Take 1 capsule (75 mg) by mouth daily   Quantity:  90 capsule   Refills:  0            Where to get your medicines      These medications were sent to [id number] - [place], [place] - [address]  [address], [place] [address]     Phone:  [phone]     venlafaxine 75 MG 24 hr capsule                Primary Care Provider Office Phone # Fax #    Ethan Adams [phone] [address] [place] [address]        Equal Access to Services     [facility] AH:  Hadii aad marco tenorioo Socharlesali, waaxda luqadaha, qaybta kaalmada harvey, andrey tkin hayaan nohelialorna hopkins lamirthaanna jeb. So [facility] [phone].    ATENCIÓN: Si cecy oliva, tiene a rizvi disposición servicios gratuitos de asistencia lingüística. Llame al [phone].    We comply with applicable federal civil rights laws and [place] laws. We do not discriminate on the basis of race, color, national origin, age, disability, sex, sexual orientation, or gender identity.            Thank you!     Thank you for choosing [facility]  for your care. Our goal is always to provide you with excellent care. Hearing back from our patients is one way we can continue to improve our services. Please take a few minutes to complete the written survey that you may receive in the mail after your visit with us. Thank you!             Your Updated Medication List - Protect others around you: Learn how to safely use, store and throw away your medicines at www.disposemymeds.org.          This list is accurate as of: 11/7/17  9:58 AM.  Always use your most recent med list.                   Brand Name Dispense Instructions for use Diagnosis    ALPRAZolam 0.5 MG tablet    XANAX    30 tablet    Take 1 tablet (0.5 mg) by mouth 3 times daily as needed for anxiety    Adjustment disorder with mixed anxiety and depressed mood       * amphetamine-dextroamphetamine 20 MG per 24 hr capsule    ADDERALL XR    30 capsule    Take 1 capsule (20 mg) by mouth daily    Attention deficit disorder (ADD) without hyperactivity       * amphetamine-dextroamphetamine 10 MG per tablet    ADDERALL    30 tablet    Take 1 tablet (10 mg) by mouth daily    Attention deficit disorder (ADD) without hyperactivity       gabapentin 300 MG capsule    NEURONTIN    30 capsule    Take 1 capsule (300 mg) by mouth At Bedtime    Primary insomnia, Restless leg syndrome       MULTIVITAMIN PO      Take 1 tablet by mouth daily        OMEGA-3 FISH OIL PO      Take 1 tablet by  mouth daily        venlafaxine 75 MG 24 hr capsule    EFFEXOR-XR    90 capsule    Take 1 capsule (75 mg) by mouth daily    Moderate major depression (H), Adjustment disorder with mixed anxiety and depressed mood       VITAMIN C PO      Take 1 tablet by mouth daily        * Notice:  This list has 2 medication(s) that are the same as other medications prescribed for you. Read the directions carefully, and ask your doctor or other care provider to review them with you.

## 2017-11-07 NOTE — NURSING NOTE
"Chief Complaint   Patient presents with     Recheck Medication       Initial /64 (Cuff Size: Adult Regular)  Pulse 89  Temp 98.2  F (36.8  C) (Oral)  Wt 148 lb (67.1 kg)  SpO2 98%  BMI 22.18 kg/m2 Estimated body mass index is 22.18 kg/(m^2) as calculated from the following:    Height as of 6/26/17: 5' 8.5\" (1.74 m).    Weight as of this encounter: 148 lb (67.1 kg).  Medication Reconciliation: complete    Clarisse Alexandre   "

## 2017-11-07 NOTE — PATIENT INSTRUCTIONS
Continue with your current Effexor dose of 75 mg once per day   A prescription for 75 mg capsules was sent to your pharmacy    I recommend that you set up counseling visits, even if you continue to feel well    Next visit with me in about 3 months

## 2017-11-24 ENCOUNTER — TELEPHONE (OUTPATIENT)
Dept: PEDIATRICS | Facility: CLINIC | Age: 32
End: 2017-11-24

## 2017-11-24 DIAGNOSIS — F32.1 MODERATE MAJOR DEPRESSION (H): ICD-10-CM

## 2017-11-24 NOTE — TELEPHONE ENCOUNTER
Pt notifies the following:     - has been taking effexor 37.5 mg 2 tabs qam for about 2 months now  - has been experiencing nausea, drowsiness, light headedness, increased vivid dreams & fogginess in head for 2-3 weeks  - nausea & lightheadedness worsening with sudden change in position(turning head from side to side or from sitting to standing)  - has been sleeping better after started effexor  - denies palpitations, trouble breathing, vision changes, vomiting, HA, weight loss, loss of appetite, constipation   - anxiety & depression has been the same  - would like to know if he can try alternate med?    Please advise. Need to notify pt. Pt can be reached at [phone](OK to LM).    Notes from 11/07/17:  Continue with your current Effexor dose of 75 mg once per day                        A prescription for 75 mg capsules was sent to your pharmacy     I recommend that you set up counseling visits, even if you continue to feel well  Next visit with me in about 3 months     Salome, RN  Triage Nurse

## 2017-11-27 RX ORDER — VENLAFAXINE HYDROCHLORIDE 37.5 MG/1
37.5 CAPSULE, EXTENDED RELEASE ORAL DAILY
Qty: 30 CAPSULE | Refills: 11 | Status: SHIPPED | OUTPATIENT
Start: 2017-11-27 | End: 2018-12-17

## 2017-11-27 NOTE — TELEPHONE ENCOUNTER
Pt called. Currently taking 75 mg Effexor per day.  Sx started 2 weeks ago.   Did not have side effects w/ 37.5 mg dose.  Recommend dropping down to 37.5 mg. New rx sent in.

## 2018-04-30 ENCOUNTER — TELEPHONE (OUTPATIENT)
Dept: PEDIATRICS | Facility: CLINIC | Age: 33
End: 2018-04-30

## 2018-04-30 DIAGNOSIS — R11.0 NAUSEA: ICD-10-CM

## 2018-04-30 RX ORDER — ONDANSETRON 4 MG/1
4 TABLET, FILM COATED ORAL EVERY 6 HOURS PRN
Qty: 20 TABLET | Refills: 1 | Status: SHIPPED | OUTPATIENT
Start: 2018-04-30 | End: 2019-02-22

## 2018-04-30 NOTE — TELEPHONE ENCOUNTER
Pt notifies that he forgot to take his Effexor 37.5 mg yesterday, which is causing severe nausea(this happened in the past).  rx'ed zofran in the past which helped. So, he is requesting us to send a rx for zofran today. He restarted effexor today.     LOV was on 11/07/17. Sent rx.    Salome RN  Triage Nurse

## 2018-12-17 DIAGNOSIS — F32.1 MODERATE MAJOR DEPRESSION (H): ICD-10-CM

## 2018-12-19 RX ORDER — VENLAFAXINE HYDROCHLORIDE 37.5 MG/1
37.5 CAPSULE, EXTENDED RELEASE ORAL DAILY
Qty: 30 CAPSULE | Refills: 0 | Status: SHIPPED | OUTPATIENT
Start: 2018-12-19 | End: 2019-01-16

## 2018-12-19 NOTE — TELEPHONE ENCOUNTER
Routing refill request to provider for review/approval because:  Pt over due for office visit.  Pt was informed to return to clinic Feb 2018.    Swathi Cuadra RN

## 2019-01-16 DIAGNOSIS — F32.1 MODERATE MAJOR DEPRESSION (H): ICD-10-CM

## 2019-01-16 RX ORDER — VENLAFAXINE HYDROCHLORIDE 37.5 MG/1
37.5 CAPSULE, EXTENDED RELEASE ORAL DAILY
Qty: 30 CAPSULE | Refills: 0 | Status: SHIPPED | OUTPATIENT
Start: 2019-01-16 | End: 2019-06-21

## 2019-01-16 NOTE — TELEPHONE ENCOUNTER
Reason for Call:  Medication or medication refill: Zeferino called to advise he had completely forgotten to schedule a med check before year end and is now on [facility] PMAP and needs to find a new doctor in his network. Is completely out of meds and is wondering if he could possibly get another 1 month refill (as a courtesy... says he knows it is his own fault) or perhaps a partial fill to get him thru the next week or 2 until he can establish care in his network.    Do you use a [facility] [place] [facility]?  Name of the pharmacy and phone number for the current request:  [id number] - [address]    Name of the medication requested: venlafaxine (EFFEXOR-XR) 37.5 MG 24 hr capsule    Other request: Please call to advise ASAP. Thanks!    Can we leave a detailed message on this number? YES    Phone number patient can be reached at: Cell number on file:    Telephone Information:   Mobile [phone]       Best Time: ASAP    Call taken on 1/16/2019 at 1:23 PM by Rosy Zaragoza

## 2019-02-15 ENCOUNTER — TELEPHONE (OUTPATIENT)
Dept: PEDIATRICS | Facility: CLINIC | Age: 34
End: 2019-02-15

## 2019-02-15 NOTE — TELEPHONE ENCOUNTER
Please call:  He can wean off Effexor since his symptoms are well controlled.  37.5 mg is the lowest dose made  He could either stop taking the Effexor, or could take a dose every other day for 1-2 weeks then stop.  He should call and also schedule a f/u visit if hs symptoms return.

## 2019-02-15 NOTE — TELEPHONE ENCOUNTER
Reason for call:  Other   Patient called regarding (reason for call): call back  Additional comments: Patient would like to stop taking antidepressant medication that Dr. Adams prescribed. He wants to know if its ok to just stop. Please call him back.    Phone number to reach patient:  Cell number on file:    Telephone Information:   Mobile [phone]       Best Time:  ASAP    Can we leave a detailed message on this number?  YES

## 2019-02-15 NOTE — TELEPHONE ENCOUNTER
I called and spoke to the pt. He is aware of the providers message below and has no further questions at this time. He says he will most likely stop taking it all together. He will follow up with us if needed.   Felicia Duvall on 2/15/2019 at 3:50 PM

## 2019-02-15 NOTE — TELEPHONE ENCOUNTER
Called pt back.     - pt has been taking effexor 37.5 mg every day for over a year now  - haven't used xanax in the last 1.5 yrs  - he is able to mange his depression/anxiety sx's effectively now  - stress at work has gone down considerably  - would like to stop effexor, if possible   - denies any SE from med or new concerning sx's  - LOV was on 11/7/17    Please advise on the weaning dosing for effexor(pharmacy selected). Need to notify pt with 's advise. Thanks.     Salome, RN  Triage Nurse

## 2019-02-21 DIAGNOSIS — R11.0 NAUSEA: ICD-10-CM

## 2019-02-21 DIAGNOSIS — F43.23 ADJUSTMENT DISORDER WITH MIXED ANXIETY AND DEPRESSED MOOD: ICD-10-CM

## 2019-02-21 NOTE — TELEPHONE ENCOUNTER
Pt would like to request for a refill but says he doesn't need 20 tablets. He would like about half of that amount instead.

## 2019-02-22 RX ORDER — ONDANSETRON 4 MG/1
4 TABLET, FILM COATED ORAL EVERY 6 HOURS PRN
Qty: 10 TABLET | Refills: 1 | Status: SHIPPED | OUTPATIENT
Start: 2019-02-22 | End: 2019-06-21

## 2019-06-21 ENCOUNTER — OFFICE VISIT (OUTPATIENT)
Dept: INTERNAL MEDICINE | Facility: CLINIC | Age: 34
End: 2019-06-21
Payer: COMMERCIAL

## 2019-06-21 VITALS
WEIGHT: 151 LBS | HEART RATE: 88 BPM | TEMPERATURE: 98.3 F | BODY MASS INDEX: 22.36 KG/M2 | DIASTOLIC BLOOD PRESSURE: 80 MMHG | HEIGHT: 69 IN | OXYGEN SATURATION: 96 % | SYSTOLIC BLOOD PRESSURE: 122 MMHG

## 2019-06-21 DIAGNOSIS — F98.8 ATTENTION DEFICIT DISORDER (ADD) WITHOUT HYPERACTIVITY: ICD-10-CM

## 2019-06-21 DIAGNOSIS — Z23 NEED FOR VACCINATION: ICD-10-CM

## 2019-06-21 DIAGNOSIS — S16.1XXD STRAIN OF NECK MUSCLE, SUBSEQUENT ENCOUNTER: ICD-10-CM

## 2019-06-21 DIAGNOSIS — M62.89 TENSOR FASCIA LATA SYNDROME: Primary | ICD-10-CM

## 2019-06-21 DIAGNOSIS — F32.1 MODERATE MAJOR DEPRESSION (H): ICD-10-CM

## 2019-06-21 PROCEDURE — 90471 IMMUNIZATION ADMIN: CPT | Performed by: INTERNAL MEDICINE

## 2019-06-21 PROCEDURE — 99214 OFFICE O/P EST MOD 30 MIN: CPT | Mod: 25 | Performed by: INTERNAL MEDICINE

## 2019-06-21 PROCEDURE — 90715 TDAP VACCINE 7 YRS/> IM: CPT | Performed by: INTERNAL MEDICINE

## 2019-06-21 RX ORDER — DEXTROAMPHETAMINE SACCHARATE, AMPHETAMINE ASPARTATE MONOHYDRATE, DEXTROAMPHETAMINE SULFATE AND AMPHETAMINE SULFATE 5; 5; 5; 5 MG/1; MG/1; MG/1; MG/1
20 CAPSULE, EXTENDED RELEASE ORAL DAILY
Qty: 30 CAPSULE | Refills: 0 | Status: SHIPPED | OUTPATIENT
Start: 2019-06-21 | End: 2020-03-17

## 2019-06-21 ASSESSMENT — PATIENT HEALTH QUESTIONNAIRE - PHQ9
10. IF YOU CHECKED OFF ANY PROBLEMS, HOW DIFFICULT HAVE THESE PROBLEMS MADE IT FOR YOU TO DO YOUR WORK, TAKE CARE OF THINGS AT HOME, OR GET ALONG WITH OTHER PEOPLE: EXTREMELY DIFFICULT
SUM OF ALL RESPONSES TO PHQ QUESTIONS 1-9: 5
SUM OF ALL RESPONSES TO PHQ QUESTIONS 1-9: 5

## 2019-06-21 ASSESSMENT — MIFFLIN-ST. JEOR: SCORE: 1622.37

## 2019-06-21 NOTE — LETTER
[facility]  06/21/19    Patient: Zeferino Reyna  YOB: 1985  Medical Record Number: [id number]  CSN: [id number]                                                                              Non-opioid Controlled Substance Agreement    I understand that my care provider has prescribed a controlled substance to help manage my condition(s). I am taking this medicine to help me function or work. I know this is strong medicine, and that it can cause serious side effects. Controlled substances can be sedating, addicting and may cause a dependency on the drug. They can affect my ability to drive or think, and cause depression. They need to be taken exactly as prescribed. Combining controlled substances with certain medicines or chemicals (such as cocaine, sedatives and tranquilizers, sleeping pills, meth) can be dangerous or even fatal. Also, if I stop controlled substances suddenly, I may have severe withdrawal symptoms.  If not helpful, I may be asked to stop them.    The risks, benefits, and side effects of these medicine(s) were explained to me. I agree that:    1. I will take part in other treatments as advised by my care team. This may be psychiatry or counseling, physical therapy, behavioral therapy, group treatment or a referral to a pain clinic. I will reduce or stop my medicine when my care team tells me to do so.  2. I will take my medicines as prescribed. I will not change the dose or schedule unless my care team tells me to. There will be no refills if I  run out early.   I may be contactedwithout warning and asked to complete a urine drug test or pill count at any time.   3. I will keep all my appointments, and understand this is part of the monitoring of controlled substances. My care team may require an office visit for EVERY controlled substance refill. If I miss appointments or don t follow instructions, my care team may stop my medicine.  4. I will not ask other  providers to prescribe controlled substances, and I will not accept controlled substances from other people. If I need another prescribed controlled substance for a new reason, I will tell my care team within 1 business day.  5. I will use one pharmacy to fill all of my controlled substance prescriptions, and it is up to me to make sure that I do not run out of my medicines on weekends or holidays. If my care team is willing to refill my controlled substance prescription without a visit, I must request refills only during office hours, refills may take up to 3 days to process, and it may take up to 5 to 7 days for my medicine to be mailed and ready at my pharmacy. Prescriptions will not be mailed anywhere except my pharmacy.    6. I am responsible for my prescriptions. If the medicine/prescription is lost or stolen, it will not be replaced. I also agree not to share controlled substance medicines with anyone.              [facility]  06/21/19  Patient:  Zeferino Reyna  YOB: 1985  Medical Record Number: [id number]  [facility]: [id number]    7. I agree to not use ANY illegal or recreational drugs. This includes marijuana, cocaine, bath salts or other drugs. I agree not to use alcohol unless my care team says I may. I agree to give urine samples whenever asked. If I don t give a urine sample, the care team may stop my medicine.    8. If I enroll in the [place] Medical Marijuana program, I will tell my care team. I will also sign an agreement to share my medical records with my care team.    9. I will bring in my list of medicines (or my medicine bottles) each time I come to the clinic.   10. I will tell my care team right away if I become pregnant or have a new medical problem treated outside of my regular clinic.  11. I understand that this medicine can affect my thinking and judgment. It may be unsafe for me to drive, use machinery and do dangerous tasks. I will not do any of these  things until I know how the medicine affects me. If my dose changes, I will wait to see how it affects me. I will contact my care team if I have concerns about medicine side effects.    I understand that if I do not follow any of the conditions above, my prescriptions or treatment may be stopped.      I agree that my provider, clinic care team, and pharmacy may work with any city, state or federal law enforcement agency that investigates the misuse, sale, or other diversion of my controlled medicine. I will allow my provider to discuss my care with or share a copy of this agreement with any other treating provider, pharmacy or emergency room where I receive care. I agree to give up (waive) any right of privacy or confidentiality with respect to these consents.   I have read this agreement and have asked questions about anything I did not understand.    ____________________________________________________    ____________  ________  Patient signature                                                         Date      Time    ____________________________________________________     ____________  ________  Witness                                                          Date      Time    ____________________________________________________  Provider signature

## 2019-06-21 NOTE — PATIENT INSTRUCTIONS
*  Referral to [facility] for help with the hip and neck areas.     *  OK to restart Adderall XR 20 mg once per day to help with concentration on the days where you need the help    *  Work with your therapist to help manage the depression and anxiety    *  Follow up with either Dr. Adams or else a new primary care at [facility], [facility], or [facility] locations, which are all much closer to where you live and work now.      *      TENSOR FASCIA LATTAE/PIRIFORMIS SYNDROME:     *  Sciatic nerve pain from piriformis syndrome (irritation of the piriformis muscle)    *  Look up stretches on the internet to manage the muscle irritation.      *  based on your history,  No evidence for herniated disc, spinal stenosis, or vertebral fracture.    *  take over the counter Motrin/Ibuprofen/Advil or Aleve to help relieve pain and inflammation.  follow the directions on the bottle.  Be sure to take with a small meal to prevent stomach upset.      --Motrin 600 mg ( 3 x 200 mg tablets) three times per day every day for 5-7 days, then 2-3 timers per day as needed (take with food to avoid stomach side effects)     OR     --Aleve 2 tablets twice per day for 5-7 days every day, then twice per day as needed     *  do not go out of your way for activity, however, do not avoid basic activates and gentle activity.  Do not lay on the sofa, do not go on bed rest.      *  moist heat as needed ( do not use a heating pad for longer than 20 minutes to lower the risk of burns)    *  avoid any lifting and running for the next 1 week, then a slow return to activity.  Remember to always use proper lifting technique, always bending at the knees rather than the waist.  Your thigh muscles are MUCH stronger than the smaller muscle of your lower back.    *  Also go to www.tptherapy.com and consider some of the massage tools (massage ball, or rollers) for the buttock muscles/feet/lower  extremities to help work on the soft tissues that can relieve the symptoms of plantar fasciitis.     *  Physical therapy through [facility] ([place] and [facility])     *  expect your back to be more easily re-aggravated over the next few weeks to months.  the soft tissue and muscles are going to be more easily re-irritated over the next several weeks so be careful to return to physical action slowly.\    *  Also go to www.tptherapy.com and consider some of the massage tools for he soft tissues.    *  Yoga poses that may help:  [place] poses (supine or half pigeon), deer pose, runner lunge, supine twists.

## 2019-06-21 NOTE — PROGRESS NOTES
"Subjective     Zeferino Reyna is a 33 year old male who presents to clinic today for the following health issues:    HPI       Pt states he has pain in his left hip, cause him to have a limp.     Also a mass on left shoulder he wants looked at.     Also needs to discuss medication refills and stating new medications    Has a history of depression with anxiety, has been working with therapist and counselor.  He previously was on SSRI medications briefly but not recently.  He is not interested in medication for this at this time.    Is a prior history of attention deficit disorder, wishes to go back onto Adderall stimulant medications to help concentration.  Is finding having difficulties performing his job.  He was placed on his medication initially in college, somewhat reluctantly.  He found it was helpful in producing better schoolwork and concentration.    Answers for HPI/ROS submitted by the patient on 6/21/2019   If you checked off any problems, how difficult have these problems made it for you to do your work, take care of things at home, or get along with other people?: Extremely difficult  PHQ9 TOTAL SCORE: 5                Reviewed and updated as needed this visit by Provider  Tobacco  Allergies  Meds  Problems  Med Hx  Surg Hx  Fam Hx         Review of Systems         Objective    /80   Pulse 88   Temp 98.3  F (36.8  C) (Temporal)   Ht 1.74 m (5' 8.5\")   Wt 68.5 kg (151 lb)   SpO2 96%   BMI 22.63 kg/m    Body mass index is 22.63 kg/m .  Physical Exam                 Past Medical History:  ---------------------------  Past Medical History:   Diagnosis Date     ADD (attention deficit disorder)      Chondromalacia patellae      Depression inactive      Eczema        Past Surgical History:  ---------------------------  History reviewed. No pertinent surgical history.    Current Medications:  ---------------------------  Current Outpatient Medications   Medication Sig Dispense Refill     " amphetamine-dextroamphetamine (ADDERALL XR) 20 MG 24 hr capsule Take 1 capsule (20 mg) by mouth daily 30 capsule 0     Multiple Vitamins-Minerals (MULTIVITAMIN OR) Take 1 tablet by mouth daily       Omega-3 Fatty Acids (OMEGA-3 FISH OIL PO) Take 1 tablet by mouth daily         Allergies:  -------------  Allergies   Allergen Reactions     Codeine Sulfate Nausea and Vomiting       Social History:  -------------------  Social History     Socioeconomic History     Marital status: Single     Spouse name: Not on file     Number of children: Not on file     Years of education: Not on file     Highest education level: Not on file   Occupational History     Not on file   Social Needs     Financial resource strain: Not on file     Food insecurity:     Worry: Not on file     Inability: Not on file     Transportation needs:     Medical: Not on file     Non-medical: Not on file   Tobacco Use     Smoking status: Never Smoker     Smokeless tobacco: Never Used   Substance and Sexual Activity     Alcohol use: Yes     Comment: weekends     Drug use: No     Sexual activity: Yes     Partners: Female     Birth control/protection: Condom   Lifestyle     Physical activity:     Days per week: Not on file     Minutes per session: Not on file     Stress: Not on file   Relationships     Social connections:     Talks on phone: Not on file     Gets together: Not on file     Attends Ca[employer] service: Not on file     Active member of club or organization: Not on file     Attends meetings of clubs or organizations: Not on file     Relationship status: Not on file     Intimate partner violence:     Fear of current or ex partner: Not on file     Emotionally abused: Not on file     Physically abused: Not on file     Forced sexual activity: Not on file   Other Topics Concern      Service Not Asked     Blood Transfusions Not Asked     Caffeine Concern No     Occupational Exposure No     Hobby Hazards No     Sleep Concern No     Stress Concern  "Yes     Weight Concern No     Special Diet No     Back Care No     Exercise Yes     Comment: 2 X WEEK     Bike Helmet Not Asked     Seat Belt Yes     Self-Exams Not Asked     Parent/sibling w/ CABG, MI or angioplasty before 65F 55M? No   Social History Narrative     Not on file       Family Medical History:  ------------------------------  Family History   Problem Relation Age of Onset     Family History Negative Mother      Cerebrovascular Disease Maternal Grandmother      Breast Cancer Paternal Grandmother          ROS:  REVIEW OF SYSTEMS:    RESP: negative for cough, dyspnea, wheezing, hemoptysis  CV: negative for chest pain, palpitations, PND, PALACIO, orthopnea; reports no significant changes in their ability to perform physical activity (from cardiovascular standpoint)  GI: negative for dysphagia, N/V, pain, melena, diarrhea and constipation  NEURO: negative for new numbness/tingling, paralysis, incoordination, or focal weakness     OBJECTIVE:                                                    /80   Pulse 88   Temp 98.3  F (36.8  C) (Temporal)   Ht 1.74 m (5' 8.5\")   Wt 68.5 kg (151 lb)   SpO2 96%   BMI 22.63 kg/m       GENERAL alert and no distress  EYES:  Normal sclera,conjunctiva, EOMI  HENT: oral and posterior pharynx without lesions or erythema, facies symmetric  NECK: No LAD, without thyroidmegaly.  Neck shows tight cervical para cervical muscles in spasm. Palpation of these muscles does reproduce the pain exactly.   Normal , finger, bicep, and tricep strength in both arms.  Negative spurlings maneuver.   Normal bicep, tricep, brachioradialis reflexs on both sides.  Neck has FROM in all directions.  No pain with direct palpation of the cervical bodies themselves, the pain is in the surrounding soft/connective tissues.  RESP: Clear to ausculation bilaterally without wheezes or crackles. Normal BS in all fields.  CV: RRR normal S1S2 without murmurs, rubs or gallops.  LYMPH: no cervical lymph " adenopathy appreciated  MS: extremities- no gross deformities of the visible extremities noted,   EXT:  no lower extremity edema  PSYCH: Alert and oriented times 3; speech- coherent  SKIN:  No obvious significant skin lesions on visible portions of face   BACK:  Pt appears uncomfortable, but not in severe distress.  Pain to palpation of paraspinal lumbar muscles, muscles in spasm, palpation reproduces pain exactly. straight leg-raises negative bilaterally.  Able to move on and off the exam table, no obsevred weakness in the feet or legs with walking, standing, or ambulation. Normal reflexes in the achilles and patellar tendons.   HIP>  Hip full range of motion withotu pain.   Pain along the left later tensor fascia trina reproduces his pain exactly.          ASSESSMENT/PLAN:                                                      (M62.89) Tensor fascia trina syndrome  (primary encounter diagnosis)  Comment: Discussed the tensor fascia trina/piriformis syndrome including anatomical features, mechanical issues and the need for physical therapy based maneuvers to treat this.  OK to use OTC NSAIDs (taken with food) as needed.  Reviewed the general risks associated with NSAIDs including GI bleed, ulcers, renal dysfunction, bleeding, etc.    Refer to physical therapy at [facility].   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  He feels that things are going fairly well at this time.  He is not interested in medication for depression or anxiety at this time.  Plan:     (F98.8) Attention deficit disorder (ADD) without hyperactivity  Comment: Discussed ADD at length, including the typical symptoms and usual diagnostic criteria, the suspected pathophysiology, and the role of medications.  Discussed specific treatment options including stimulants (such as Adderall, Ritalin, and  Strattera) including their possible side effects and the fact that they are controlled substances which require special handling of prescriptions (require written RX's) and close monitoring including regular follow up.  I told the patient that any noncompliance with regard to prescriptions or follow up may result in us declining to provide further refills of these medications.   He should return to follow-up with either his primary MD or a clinic closer to his home in [facility] or work location in [facility].  Plan: amphetamine-dextroamphetamine (ADDERALL XR) 20         MG 24 hr capsule            (S16.1XXD) Strain of neck muscle, subsequent encounter  Comment: No evidence of disc herniation or verterbal injury.    Discussed typical mechanical neck pain, typical causes, and atypical neck pain, including red flag symptoms.  Discussed conservative tratments inclduing physical therpy, stretching and strengthening, use of heat and/or ice, NSAIDs with food, antispasmodics where indicated, and the use of narcotic pain meds where indicated.     Plan: [facility] PT, HAND, AND CHIROPRACTIC REFERRAL            (Z23) Need for vaccination  Comment:   Plan:      ADMIN VACCINE, FIRST               See Patient Instructions    RENETTA CARRASCO M.D., MD  [facility]    I spent greater than 25 minutes with pt, greater than 50% of time was educational and counseling.      (Chart documentation may have been completed, in part, with [employer] voice-recognition software. Even though reviewed, some grammatical, spelling, and word errors may remain.)

## 2019-06-22 ASSESSMENT — PATIENT HEALTH QUESTIONNAIRE - PHQ9: SUM OF ALL RESPONSES TO PHQ QUESTIONS 1-9: 5

## 2019-06-26 ENCOUNTER — TELEPHONE (OUTPATIENT)
Dept: INTERNAL MEDICINE | Facility: CLINIC | Age: 34
End: 2019-06-26

## 2019-06-26 DIAGNOSIS — M62.89 TENSOR FASCIA LATA SYNDROME: Primary | ICD-10-CM

## 2019-06-26 NOTE — TELEPHONE ENCOUNTER
Refer to PT at [facility]    If no better, then return to see his usual primary MD.

## 2019-06-26 NOTE — TELEPHONE ENCOUNTER
Please see patient response regarding recurrent hip pain.     Per LOV note looks like recommendation of [facility]. Referral Td'up. Please sign if appropriate.     Cristy FLORES RN, BSN, PHN

## 2019-06-26 NOTE — TELEPHONE ENCOUNTER
Reason for Call:  Other call back    Detailed comments: pt wants a call back from dr. Galvan or his nurse, pt said that he did stretching, resting and ibuprofen and its not helping. Pt wants to know what else he can do. Pt said that he is not getting any progress with the plan that dr. Galvan recommended. Please call pt back on what to do next. Thanks.    Phone Number Patient can be reached at: Cell number on file:    Telephone Information:   Mobile [phone]       Best Time: anytime    Can we leave a detailed message on this number? YES    Call taken on 6/26/2019 at 11:46 AM by LEONID BERNABE

## 2019-06-27 ENCOUNTER — THERAPY VISIT (OUTPATIENT)
Dept: PHYSICAL THERAPY | Facility: CLINIC | Age: 34
End: 2019-06-27
Attending: INTERNAL MEDICINE
Payer: COMMERCIAL

## 2019-06-27 DIAGNOSIS — M25.552 HIP PAIN, LEFT: Primary | ICD-10-CM

## 2019-06-27 DIAGNOSIS — M25.512 SHOULDER PAIN, LEFT: ICD-10-CM

## 2019-06-27 PROCEDURE — 97110 THERAPEUTIC EXERCISES: CPT | Mod: GP | Performed by: PHYSICAL THERAPIST

## 2019-06-27 PROCEDURE — 97161 PT EVAL LOW COMPLEX 20 MIN: CPT | Mod: GP | Performed by: PHYSICAL THERAPIST

## 2019-06-27 NOTE — PROGRESS NOTES
[facility] Initial Evaluation  Subjective:    Type of problem:  Left hip   Condition occurred with:  Insidious onset. This is a new condition    Patient reports pain:  Anterior and lateral. Radiates to: glute, posterior thigh. Associated symptoms:  Buckling/giving out and numbness. Symptoms are exacerbated by sitting                     Prior history of L shoulder separation and clavicle fracture 8 years ago, which leads to some irritation and neck pain. With the hip, he feels tightness and a lot of pain with sitting that leads to referred pain into the thigh.   The hip has been a recurring issue with soccer and softball in the summer, but only over the last 2 weeks.    Some radiation of pain in the posterior gluteal and posterior thigh/anterior lower leg, but no reports of n/t or anything into the toes. No clicking, locking, catching in the deep hip or any groin pain.    PMH: None    Current pain: 2-3/10 in hip, 0/10 in shoulder/neck    Medical allergies: Codeine    Surgical history: None    Medication: Aderral    Occupation: , [employer] - prolonged sitting      Objective:    Gait:    Gait Type:  Normal                    Lumbar/SI Evaluation  ROM:    AROM Lumbar:   Flexion:          Pain in L posterior thigh - good motion  Ext:                    No pain, full motion   Side Bend:        Left:  WNL, no pain    Right:  WNL no pain  Rotation:           Left:     Right:   Side Glide:        Left:     Right:                   Neural Tension/Mobility:      Left side:Slump  negative.     Right side:   Slump  negative.                     Shoulder Evaluation:  ROM:  AROM:  normal (step deformity noted at AC joint - indicative of prior separation - significant scapular winging noted BL with IR behind the back testing)                                                                  Hip Evaluation  HIP AROM:  : Pain with prone hamstring MMT testing (at 90 degrees knee flexion and  at 15 degrees knee flexion) - pain reproduced in proximal hamstring   Flexion: Left: WNL, no pain     Right:  WNL, no pain          Internal Rotation: Left: 47, no pain, some discomfort at anteriro-lateral hip    Right: 46  External Rotation: Left: 45    Right: 35      Hip PROM:  : Positive 90/90 test on L compared to R (35 degrees shy of fully straight on L, fully straight on R)                          Hip Strength:      Extension:  Left: 5/5  Pain:Right: 5/5    Pain:    Abduction:  Left: 5/5     Pain:Right: 5/5    Pain:        Knee Flexion:  Left: 5/5    Pain:strong/painfulRight: 5/5    Pain: strong/painful          Hip Special Testing:   Special tests hip not assessed: Negative log roll, scour, FADIR  BL.  Left hip positive for the following special tests:  SLR  Left hip negative for the following special tests:  Fadir/Labrum  Right hip negative for the following special tests:  Fadir/Labrum or SLR    Hip Palpation:    Left hip tenderness present at:   Ischial Tuberosity  Left hip tenderness not present at:  Greater Trachanter    Right hip tenderness not present at:  Ischial Tuberosity or Greater Trachanter             General     ROS    Assessment/Plan:    Patient is a 33 year old male with left side shoulder and left side hip complaints.    Patient has the following significant findings with corresponding treatment plan.                Diagnosis 1:  L hip pain  Pain -  hot/cold therapy  Decreased ROM/flexibility - manual therapy and therapeutic exercise  Impaired muscle performance - neuro re-education  Decreased function - therapeutic activities  Diagnosis 2:  L shoulder pain   Pain -  hot/cold therapy  Impaired muscle performance - neuro re-education  Decreased function - therapeutic activities    Therapy Evaluation Codes:   1) History comprised of:   Personal factors that impact the plan of care:      None.    Comorbidity factors that impact the plan of care are:      Depression.     Medications impacting  care: None.  2) Examination of Body Systems comprised of:   Body structures and functions that impact the plan of care:      Hip and Shoulder.   Activity limitations that impact the plan of care are:      Sports.  3) Clinical presentation characteristics are:   Stable/Uncomplicated.  4) Decision-Making    Moderate complexity using standardized patient assessment instrument and/or measureable assessment of functional outcome.  Cumulative Therapy Evaluation is: Moderate complexity.    Previous and current functional limitations:  (See Goal Flow Sheet for this information)    Short term and Long term goals: (See Goal Flow Sheet for this information)     Communication ability:  Patient appears to be able to clearly communicate and understand verbal and written communication and follow directions correctly.  Treatment Explanation - The following has been discussed with the patient:   RX ordered/plan of care  Anticipated outcomes  Possible risks and side effects  This patient would benefit from PT intervention to resume normal activities.   Rehab potential is excellent.    Frequency:  1 X week, once daily  Duration:  for 8 weeks  Discharge Plan:  Achieve all LTG.  Independent in home treatment program.  Reach maximal therapeutic benefit.    Please refer to the daily flowsheet for treatment today, total treatment time and time spent performing 1:1 timed codes.

## 2019-09-05 ENCOUNTER — TELEPHONE (OUTPATIENT)
Dept: INTERNAL MEDICINE | Facility: CLINIC | Age: 34
End: 2019-09-05

## 2019-09-05 NOTE — TELEPHONE ENCOUNTER
Prior Authorization Retail Medication Request    Medication/Dose: D- AMPHETAMINE ER 20mg SALT COMBO  ICD code (if different than what is on RX):  F98.8  Previously Tried and Failed:    Rationale:      Insurance Name:  [employer] [phone]  Insurance ID:  [id number]      Pharmacy Information (if different than what is on RX)  Name:  [facility] [place], [place]  Phone:  [phone]

## 2019-09-12 NOTE — TELEPHONE ENCOUNTER
Prior Authorization Approval    Authorization Effective Date: 9/12/2019  Authorization Expiration Date: 9/11/2022  Medication: D- AMPHETAMINE ER 20mg SALT COMBO-APPROVED  Approved Dose/Quantity:   Reference #:     Insurance Company: [employer] [phone] Fax [phone]  Expected CoPay:       CoPay Card Available:      Foundation Assistance Needed:    Which Pharmacy is filling the prescription (Not needed for infusion/clinic administered): [facility][id number] [address] AT [facility]  Pharmacy Notified: Yes  Patient Notified: No    Pharmacy will notify patient when medication is ready.

## 2019-09-12 NOTE — TELEPHONE ENCOUNTER
Central Prior Authorization Team   Phone: [phone]      PA Initiation    Medication: D- AMPHETAMINE ER 20mg SALT COMBO-Initiated  Insurance Company: [employer] Phone [phone] Fax [phone]  Pharmacy Filling the Rx: [facility][id number] [address] AT [facility]  Filling Pharmacy Phone: [phone]  Filling Pharmacy Fax:    Start Date: 9/12/2019

## 2019-10-01 ENCOUNTER — TELEPHONE (OUTPATIENT)
Dept: PEDIATRICS | Facility: CLINIC | Age: 34
End: 2019-10-01

## 2019-10-01 DIAGNOSIS — F43.23 ADJUSTMENT DISORDER WITH MIXED ANXIETY AND DEPRESSED MOOD: ICD-10-CM

## 2019-10-01 DIAGNOSIS — F98.8 ATTENTION DEFICIT DISORDER (ADD) WITHOUT HYPERACTIVITY: ICD-10-CM

## 2019-10-01 DIAGNOSIS — F32.1 MODERATE MAJOR DEPRESSION (H): Primary | ICD-10-CM

## 2019-10-01 NOTE — TELEPHONE ENCOUNTER
Pt is asking for a referral to see Psychiatry.  If Dr Adams has any Psych recommendations, pt would welcome.  (Pt lives in [place] [facility] [place] [facility] area.)  Please advise.  Please call pt at [phone] with referral, recommendations or questions.   Thank you.  nick

## 2019-10-02 NOTE — TELEPHONE ENCOUNTER
Called patient to notify of referral. No answer left detailed message.    Advised patient that he has not seen PCP in 2 years, and for continuing care an appointment should be scheduled to see Dr. Adams. Provided referral information.    TC/MA: if patient calls back please assist with scheduling an appointment with Dr. Adams.

## 2019-10-02 NOTE — TELEPHONE ENCOUNTER
Pt calling req referral for psychiatry  Last OV with PCP was in Nov. 2017.    Please advise.  MARK Deluna, Triage RN

## 2019-11-20 ENCOUNTER — TELEPHONE (OUTPATIENT)
Dept: PEDIATRICS | Facility: CLINIC | Age: 34
End: 2019-11-20

## 2019-11-20 NOTE — TELEPHONE ENCOUNTER
Able to talk to pt today for a different concern(see phone encounter from today). Pt received a call from the psychiatry office to schedule an appointment, but he didn't want to go to that particular group.  He lives in [facility] [place] now & doesn't want to come to [place] location. So, provided [facility] contact number to call & est care with one of their provider to help with his concern.    Pt agrees to the plan.    Salome, RN  Triage Nurse

## 2019-11-20 NOTE — TELEPHONE ENCOUNTER
LOV was on 11/7/17.     Called pt back to get details. Pain & numbness started about a week ago. Denies any fall/injury. Denies any cardiac sx's. Pain & numbness radiated from L elbow to finger tips. Has a hx of L shoulder blade & collar bone fracture. Pt was mild a week ago, suddenly increased lately. Denies redness/swelling/weakness.     Advised pt to come in to be seen in [facility]. Pt says that he lives in [facility] now & doesn't want to come to [place] location any more. So, provided [facility] numbers & times. Advised him to go to [facility] for elbow pain & est care with a provider in [facility] to start refilling meds.     Pt agrees to the plan.    Salome, RN  Triage Nurse

## 2019-11-20 NOTE — TELEPHONE ENCOUNTER
Reason for call:  Other   Patient called regarding (reason for call): call back  Additional comments: Patient called stating that over the past week, he has lost feeling in 3 of 5 fingers on his left hand, and there is a numbness up to his elbow. States no other symptoms, however he is concerned, and would like to speak with a nurse.  Additionally, he is needing refills on a couple meds, and cannot get refills until he sees his PCP, and Dr Adams's first available is not until 1/21/20. Please route to team D after triage concern is addressed, if possible.   Thanks.     Phone number to reach patient:  Home number on file [phone] (home)    Best Time:  Any    Can we leave a detailed message on this number?  YES

## 2020-01-30 ENCOUNTER — TELEPHONE (OUTPATIENT)
Dept: PEDIATRICS | Facility: CLINIC | Age: 35
End: 2020-01-30

## 2020-01-30 NOTE — TELEPHONE ENCOUNTER
Woke up at 2 am vomited for 14 hours, urine with blood in it. Fever. Feels a little better today.     No longer vomiting. Last night was the last time. Temp is coming down now. He is feeling better. Concerned because his urine is so dark. Wonders if it is blood. No dysuria, no flank pain. We discussed how decreased fluid intake can cause your urine to be dark in appearance. Since he is no longer vomiting he will work on hydration today. If urine still seems dark after he gets more fluid in him he will call.    He will call as needed.Doreen Mccray, RN on 1/30/2020 at 9:35 AM

## 2020-02-20 ENCOUNTER — OFFICE VISIT (OUTPATIENT)
Dept: PEDIATRICS | Facility: CLINIC | Age: 35
End: 2020-02-20
Payer: COMMERCIAL

## 2020-02-20 VITALS
OXYGEN SATURATION: 97 % | TEMPERATURE: 98.5 F | DIASTOLIC BLOOD PRESSURE: 64 MMHG | BODY MASS INDEX: 21.98 KG/M2 | HEIGHT: 69 IN | WEIGHT: 148.4 LBS | HEART RATE: 78 BPM | SYSTOLIC BLOOD PRESSURE: 108 MMHG

## 2020-02-20 DIAGNOSIS — L98.9 SKIN LESION: ICD-10-CM

## 2020-02-20 DIAGNOSIS — F98.8 ATTENTION DEFICIT DISORDER (ADD) WITHOUT HYPERACTIVITY: Primary | ICD-10-CM

## 2020-02-20 LAB
AMPHETAMINES UR QL SCN: NEGATIVE
BARBITURATES UR QL: NEGATIVE
BENZODIAZ UR QL: NEGATIVE
CANNABINOIDS UR QL SCN: POSITIVE
COCAINE UR QL: NEGATIVE
ETHANOL UR QL SCN: NEGATIVE
OPIATES UR QL SCN: NEGATIVE
PCP UR QL SCN: NEGATIVE

## 2020-02-20 PROCEDURE — 80307 DRUG TEST PRSMV CHEM ANLYZR: CPT | Performed by: NURSE PRACTITIONER

## 2020-02-20 PROCEDURE — 99214 OFFICE O/P EST MOD 30 MIN: CPT | Performed by: NURSE PRACTITIONER

## 2020-02-20 PROCEDURE — 80320 DRUG SCREEN QUANTALCOHOLS: CPT | Performed by: NURSE PRACTITIONER

## 2020-02-20 RX ORDER — DEXTROAMPHETAMINE SACCHARATE, AMPHETAMINE ASPARTATE MONOHYDRATE, DEXTROAMPHETAMINE SULFATE AND AMPHETAMINE SULFATE 5; 5; 5; 5 MG/1; MG/1; MG/1; MG/1
20 CAPSULE, EXTENDED RELEASE ORAL DAILY
Qty: 30 CAPSULE | Refills: 0 | Status: SHIPPED | OUTPATIENT
Start: 2020-02-20 | End: 2020-03-21

## 2020-02-20 RX ORDER — DEXTROAMPHETAMINE SACCHARATE, AMPHETAMINE ASPARTATE MONOHYDRATE, DEXTROAMPHETAMINE SULFATE AND AMPHETAMINE SULFATE 5; 5; 5; 5 MG/1; MG/1; MG/1; MG/1
20 CAPSULE, EXTENDED RELEASE ORAL DAILY
Qty: 30 CAPSULE | Refills: 0 | Status: SHIPPED | OUTPATIENT
Start: 2020-04-22 | End: 2020-05-22

## 2020-02-20 RX ORDER — DEXTROAMPHETAMINE SACCHARATE, AMPHETAMINE ASPARTATE, DEXTROAMPHETAMINE SULFATE AND AMPHETAMINE SULFATE 2.5; 2.5; 2.5; 2.5 MG/1; MG/1; MG/1; MG/1
10 TABLET ORAL DAILY PRN
Qty: 30 TABLET | Refills: 0 | Status: SHIPPED | OUTPATIENT
Start: 2020-02-20 | End: 2020-08-25

## 2020-02-20 RX ORDER — DEXTROAMPHETAMINE SACCHARATE, AMPHETAMINE ASPARTATE MONOHYDRATE, DEXTROAMPHETAMINE SULFATE AND AMPHETAMINE SULFATE 5; 5; 5; 5 MG/1; MG/1; MG/1; MG/1
20 CAPSULE, EXTENDED RELEASE ORAL DAILY
Qty: 30 CAPSULE | Refills: 0 | Status: SHIPPED | OUTPATIENT
Start: 2020-03-22 | End: 2020-04-21

## 2020-02-20 ASSESSMENT — PATIENT HEALTH QUESTIONNAIRE - PHQ9
SUM OF ALL RESPONSES TO PHQ QUESTIONS 1-9: 10
SUM OF ALL RESPONSES TO PHQ QUESTIONS 1-9: 10
10. IF YOU CHECKED OFF ANY PROBLEMS, HOW DIFFICULT HAVE THESE PROBLEMS MADE IT FOR YOU TO DO YOUR WORK, TAKE CARE OF THINGS AT HOME, OR GET ALONG WITH OTHER PEOPLE: VERY DIFFICULT

## 2020-02-20 ASSESSMENT — ENCOUNTER SYMPTOMS
UNEXPECTED WEIGHT CHANGE: 0
SLEEP DISTURBANCE: 0
DECREASED CONCENTRATION: 1
NERVOUS/ANXIOUS: 0
ACTIVITY CHANGE: 0
APPETITE CHANGE: 0
HYPERACTIVE: 0

## 2020-02-20 ASSESSMENT — MIFFLIN-ST. JEOR: SCORE: 1613.52

## 2020-02-20 NOTE — LETTER
[facility]  02/20/20    Patient: Zeferino Reyna  YOB: 1985  Medical Record Number: [id number]  CSN: [id number]                                                                              Non-opioid Controlled Substance Agreement    I understand that my care provider has prescribed a controlled substance to help manage my condition(s). I am taking this medicine to help me function or work. I know this is strong medicine, and that it can cause serious side effects. Controlled substances can be sedating, addicting and may cause a dependency on the drug. They can affect my ability to drive or think, and cause depression. They need to be taken exactly as prescribed. Combining controlled substances with certain medicines or chemicals (such as cocaine, sedatives and tranquilizers, sleeping pills, meth) can be dangerous or even fatal. Also, if I stop controlled substances suddenly, I may have severe withdrawal symptoms.  If not helpful, I may be asked to stop them.    The risks, benefits, and side effects of these medicine(s) were explained to me. I agree that:    1. I will take part in other treatments as advised by my care team. This may be psychiatry or counseling, physical therapy, behavioral therapy, group treatment or a referral to a pain clinic. I will reduce or stop my medicine when my care team tells me to do so.  2. I will take my medicines as prescribed. I will not change the dose or schedule unless my care team tells me to. There will be no refills if I  run out early.   I may be contactedwithout warning and asked to complete a urine drug test or pill count at any time.   3. I will keep all my appointments, and understand this is part of the monitoring of controlled substances. My care team may require an office visit for EVERY controlled substance refill. If I miss appointments or don t follow instructions, my care team may stop my medicine.  4. I will not ask other providers to  prescribe controlled substances, and I will not accept controlled substances from other people. If I need another prescribed controlled substance for a new reason, I will tell my care team within 1 business day.  5. I will use one pharmacy to fill all of my controlled substance prescriptions, and it is up to me to make sure that I do not run out of my medicines on weekends or holidays. If my care team is willing to refill my controlled substance prescription without a visit, I must request refills only during office hours, refills may take up to 3 days to process, and it may take up to 5 to 7 days for my medicine to be mailed and ready at my pharmacy. Prescriptions will not be mailed anywhere except my pharmacy.    6. I am responsible for my prescriptions. If the medicine/prescription is lost or stolen, it will not be replaced. I also agree not to share controlled substance medicines with anyone.              [facility]  02/20/20  Patient:  Zeferino Reyna  YOB: 1985  Medical Record Number: [id number]  [facility]: [id number]    7. I agree to not use ANY illegal or recreational drugs. This includes marijuana, cocaine, bath salts or other drugs. I agree not to use alcohol unless my care team says I may. I agree to give urine samples whenever asked. If I don t give a urine sample, the care team may stop my medicine.    8. If I enroll in the [place] Medical Marijuana program, I will tell my care team. I will also sign an agreement to share my medical records with my care team.    9. I will bring in my list of medicines (or my medicine bottles) each time I come to the clinic.   10. I will tell my care team right away if I become pregnant or have a new medical problem treated outside of my regular clinic.  11. I understand that this medicine can affect my thinking and judgment. It may be unsafe for me to drive, use machinery and do dangerous tasks. I will not do any of these things until I know how the  medicine affects me. If my dose changes, I will wait to see how it affects me. I will contact my care team if I have concerns about medicine side effects.    I understand that if I do not follow any of the conditions above, my prescriptions or treatment may be stopped.      I agree that my provider, clinic care team, and pharmacy may work with any city, state or federal law enforcement agency that investigates the misuse, sale, or other diversion of my controlled medicine. I will allow my provider to discuss my care with or share a copy of this agreement with any other treating provider, pharmacy or emergency room where I receive care. I agree to give up (waive) any right of privacy or confidentiality with respect to these consents.   I have read this agreement and have asked questions about anything I did not understand.    ____________________________________________________    ____________  ________  Patient signature                                                         Date      Time    ____________________________________________________     ____________  ________  Witness                                                          Date      Time    ____________________________________________________  Provider signature

## 2020-02-20 NOTE — PROGRESS NOTES
"Subjective     Zeferino Reyna is a 34 year old male who presents to clinic today for the following health issues:    ADD:  Zeferino presents today for ADD follow-up and mediation management. Reports a longstanding history of ADD, has been previously managed on Adderall since high school, however hasn't taken the medication for the past 2-3 months secondary to inability to get a timely follow-up appointment with his PCP. Upon running out of medication, he noted in increasingly difficult time focusing and had increased anxiety and depression as a result. Found that he wasn't as \"proactive\" at work. He feels his anxiety and depression are under good control at this time, has taken venlafaxine in the past but not currently. Feels his most recent anxiety and depression were situational related to a break-up with his girlfriend that ended about 5 months ago.     Zeferino reports that he was first started on Adderall in high school, was started on 30 mg XR, which felt like too high of a dose. Adderall was decreased to 20 mg XR, which was a dose he reportedly remained on for about 14 years. He would also take Adderall IR 10 mg in the afternoons as needed, about 1-2 times weekly. While taking Adderall previously, patient reports noting a decreased appetite but was aware of it and continued to eat his normal amount, denied unplanned weight loss. Noted problems sleeping at night if he took his prn dose too late in the afternoon. He exercises regularly, running 5Ks and lifting weights.     Patient admits to occasionally smoking marijuana when visiting friends in [place].     Possible wart:  Zeferino has concerns regarding a possible wart on the bottom of his left foot. He noted pain to the bottom of his left foot about 4 days ago, however this resolved spontaneously and has not returned, suspects this was secondary to running.       Past Medical History:   Diagnosis Date     ADD (attention deficit disorder)      Chondromalacia patellae " "     Depression inactive      Eczema      Current Outpatient Medications   Medication     amphetamine-dextroamphetamine (ADDERALL XR) 20 MG 24 hr capsule     Multiple Vitamins-Minerals (MULTIVITAMIN OR)     Omega-3 Fatty Acids (OMEGA-3 FISH OIL PO)     No current facility-administered medications for this visit.         Allergies   Allergen Reactions     Codeine Sulfate Nausea and Vomiting     Codeine Nausea and Vomiting       Reviewed and updated as needed this visit by Provider       Review of Systems   Constitutional: Negative for activity change, appetite change and unexpected weight change.   Skin:        Lesion on sole of left foot.    Psychiatric/Behavioral: Positive for decreased concentration. Negative for mood changes and sleep disturbance. The patient is not nervous/anxious and is not hyperactive.           Objective    /64 (BP Location: Right arm, Patient Position: Sitting, Cuff Size: Adult Regular)   Pulse 78   Temp 98.5  F (36.9  C) (Tympanic)   Ht 1.753 m (5' 9\")   Wt 67.3 kg (148 lb 6.4 oz)   SpO2 97%   BMI 21.91 kg/m    Body mass index is 21.91 kg/m .  Physical Exam  Constitutional:       Appearance: Normal appearance. He is not ill-appearing.   Cardiovascular:      Rate and Rhythm: Normal rate and regular rhythm.      Heart sounds: Normal heart sounds. No murmur. No friction rub. No gallop.    Pulmonary:      Effort: Pulmonary effort is normal. No respiratory distress.      Breath sounds: Normal breath sounds. No wheezing, rhonchi or rales.   Skin:     General: Skin is warm and dry.      Comments: Pin point dark lesion on plantar surface of left foot, no obvious verrucous lesion present.    Neurological:      General: No focal deficit present.      Mental Status: He is alert and oriented to person, place, and time.   Psychiatric:         Mood and Affect: Mood normal.         Behavior: Behavior normal.        Laboratory testing pending:  Drug abuse screen 8 urine     Assessment & Plan "     1. Attention deficit disorder (ADD) without hyperactivity  Comment: Pt with longstanding hx of ADD, previously managed on Adderall but ran out of medication 2-3 months ago stating he was unable to get an appointment with provider for refills. Pt has noted re-occurring inability to focus since running out of medication, requesting refills at his previous dose: Adderall XR 20 mg daily with Adderall IR 10 mg tablet as needed in the afternoons, uses 1-2 times weekly. Will give Rx for 30 tablets of Adderall IR 10 mg with expectation that this will last 90 days. Controlled substance agreement completed today. Urine drug screen pending, pt admits to occasional marijuana use.   Plan:   - Drug abuse screen 8 urine (UR)   - amphetamine-dextroamphetamine (ADDERALL XR) 20 MG PO 24 hr capsule; Take 1 capsule (20 mg) by mouth daily  Dispense: 30 capsule; Refill: 0   - amphetamine-dextroamphetamine (ADDERALL XR) 20 MG PO 24 hr capsule; Take 1 capsule (20 mg) by mouth daily  Dispense: 30 capsule; Refill: 0   - amphetamine-dextroamphetamine (ADDERALL XR) 20 MG PO 24 hr capsule; Take 1 capsule (20 mg) by mouth daily  Dispense: 30 capsule; Refill: 0   - amphetamine-dextroamphetamine 10 MG PO tablet; Take 1 tablet (10 mg) by mouth daily as needed (inability to focus)  Dispense: 30 tablet; Refill: 0    2. Skin lesion  Comment: Unclear etiology, possible a splinter. Recommend watchful waiting for now, will treat with cryotherapy if lesion growing and becoming increasingly bothersome. Pt in agreement.   Plan:   - Continue to monitor   - Counseled on reasons to return to clinic      Follow-up: 3 months for medication refills, sooner if concerns. My contact via [employer] if stable and no concerns.        BAILEY Duarte [facility] JOSIE      Answers for HPI/ROS submitted by the patient on 2/20/2020   Chronic problems general questions HPI Form  If you checked off any problems, how difficult have these problems made it for  you to do your work, take care of things at home, or get along with other people?: Very difficult  PHQ9 TOTAL SCORE: 10

## 2020-02-20 NOTE — PATIENT INSTRUCTIONS
1) Please contact clinic in 3 months when you are in need of refills, try to give us at least a weeks heads up before you run out of medication.     2) Urine test today.

## 2020-02-21 ASSESSMENT — PATIENT HEALTH QUESTIONNAIRE - PHQ9: SUM OF ALL RESPONSES TO PHQ QUESTIONS 1-9: 10

## 2020-03-17 ENCOUNTER — OFFICE VISIT (OUTPATIENT)
Dept: PEDIATRICS | Facility: CLINIC | Age: 35
End: 2020-03-17
Payer: COMMERCIAL

## 2020-03-17 VITALS
RESPIRATION RATE: 16 BRPM | HEART RATE: 89 BPM | HEIGHT: 68 IN | TEMPERATURE: 98.5 F | BODY MASS INDEX: 21.98 KG/M2 | DIASTOLIC BLOOD PRESSURE: 76 MMHG | OXYGEN SATURATION: 98 % | WEIGHT: 145 LBS | SYSTOLIC BLOOD PRESSURE: 106 MMHG

## 2020-03-17 DIAGNOSIS — Z00.00 ROUTINE GENERAL MEDICAL EXAMINATION AT A HEALTH CARE FACILITY: Primary | ICD-10-CM

## 2020-03-17 PROCEDURE — 99395 PREV VISIT EST AGE 18-39: CPT | Performed by: INTERNAL MEDICINE

## 2020-03-17 ASSESSMENT — ENCOUNTER SYMPTOMS
WEAKNESS: 0
HEMATURIA: 0
HEARTBURN: 0
PARESTHESIAS: 0
COUGH: 0
JOINT SWELLING: 0
HEMATOCHEZIA: 0
DIZZINESS: 0
ARTHRALGIAS: 0
ABDOMINAL PAIN: 0
CHILLS: 0
MYALGIAS: 0
FEVER: 0
SHORTNESS OF BREATH: 0
DIARRHEA: 0
NAUSEA: 0
FREQUENCY: 0
CONSTIPATION: 0
SORE THROAT: 0
PALPITATIONS: 0
DYSURIA: 0
HEADACHES: 0
EYE PAIN: 0
NERVOUS/ANXIOUS: 0

## 2020-03-17 ASSESSMENT — MIFFLIN-ST. JEOR: SCORE: 1586.47

## 2020-03-17 NOTE — PROGRESS NOTES
SUBJECTIVE:   CC: Zeferino Reyna is an 34 year old male who presents for preventative health visit.     Healthy Habits:     Getting at least 3 servings of Calcium per day:  Yes    Bi-annual eye exam:  NO    Dental care twice a year:  Yes    Sleep apnea or symptoms of sleep apnea:  Daytime drowsiness    Diet:  Regular (no restrictions)    Frequency of exercise:  4-5 days/week    Duration of exercise:  30-45 minutes    Taking medications regularly:  Yes    Medication side effects:  None    PHQ-2 Total Score: 2    Additional concerns today:  No    ADD. Taking Adderall. Working well.  No side effects.    Wt Readings from Last 4 Encounters:   03/17/20 65.8 kg (145 lb)   02/20/20 67.3 kg (148 lb 6.4 oz)   06/21/19 68.5 kg (151 lb)   11/07/17 67.1 kg (148 lb)     BP Readings from Last 3 Encounters:   03/17/20 106/76   02/20/20 108/64   06/21/19 122/80     Depression history w/ some anxiety.  Feels that his mood is well controlled.   Does have a higher PHQ9 score, but does not feel that he would like to add medications.     Today's PHQ-2 Score:   PHQ-2 ( 1999 Pf[employer]) 3/17/2020   Q1: Little interest or pleasure in doing things 1   Q2: Feeling down, depressed or hopeless 1   PHQ-2 Score 2   Q1: Little interest or pleasure in doing things Several days   Q2: Feeling down, depressed or hopeless Several days   PHQ-2 Score 2     PHQ 10/3/2017 6/21/2019 2/20/2020   PHQ-9 Total Score 20 5 10   Q9: Thoughts of better off dead/self-harm past 2 weeks More than half the days Not at all Not at all       Abuse: Current or Past(Physical, Sexual or Emotional)- No  Do you feel safe in your environment? Yes      Social History     Tobacco Use     Smoking status: Never Smoker     Smokeless tobacco: Never Used   Substance Use Topics     Alcohol use: Yes     Comment: 8 drinks weekly     If you drink alcohol do you typically have >3 drinks per day or >7 drinks per week? No    Alcohol Use 3/17/2020   Prescreen: >3 drinks/day or >7 drinks/week? Yes  "  Prescreen: >3 drinks/day or >7 drinks/week? -   AUDIT SCORE  5       Reviewed orders with patient. Reviewed health maintenance and updated orders accordingly - Yes      Reviewed and updated as needed this visit by Provider  Tobacco  Allergies  Meds  Problems  Med Hx  Surg Hx  Fam Hx            Review of Systems   Constitutional: Negative for chills and fever.   HENT: Negative for congestion, ear pain, hearing loss and sore throat.    Eyes: Negative for pain and visual disturbance.   Respiratory: Negative for cough and shortness of breath.    Cardiovascular: Negative for chest pain, palpitations and peripheral edema.   Gastrointestinal: Negative for abdominal pain, constipation, diarrhea, heartburn, hematochezia and nausea.   Genitourinary: Negative for discharge, dysuria, frequency, genital sores, hematuria, impotence and urgency.   Musculoskeletal: Negative for arthralgias, joint swelling and myalgias.   Skin: Negative for rash.   Neurological: Negative for dizziness, weakness, headaches and paresthesias.   Psychiatric/Behavioral: Negative for mood changes. The patient is not nervous/anxious.        OBJECTIVE:   /76 (BP Location: Right arm, Patient Position: Sitting, Cuff Size: Adult Regular)   Pulse 89   Temp 98.5  F (36.9  C) (Tympanic)   Resp 16   Ht 1.734 m (5' 8.27\")   Wt 65.8 kg (145 lb)   SpO2 98%   BMI 21.87 kg/m      Physical Exam  GENERAL: healthy, alert and no distress  EYES: Eyes grossly normal to inspection, PERRL and conjunctivae and sclerae normal  HENT: ear canals and TM's normal, nose and mouth without ulcers or lesions  NECK: no adenopathy, no asymmetry, masses, or scars and thyroid normal to palpation  RESP: lungs clear to auscultation - no rales, rhonchi or wheezes  CV: regular rate and rhythm, normal S1 S2, no S3 or S4, no murmur, click or rub, no peripheral edema and peripheral pulses strong  ABDOMEN: soft, nontender, no hepatosplenomegaly, no masses and bowel sounds " "normal  : normal male genitalia without lesions or urethral discharge, no hernia  MS: no gross musculoskeletal defects noted, no edema  SKIN: no suspicious lesions or rashes  NEURO: Normal strength and tone, mentation intact and speech normal  PSYCH: mentation appears normal, affect normal/bright      ASSESSMENT/PLAN:       ICD-10-CM    1. Routine general medical examination at a health care facility  Z00.00        COUNSELING:   Reviewed preventive health counseling, as reflected in patient instructions    Estimated body mass index is 21.87 kg/m  as calculated from the following:    Height as of this encounter: 1.734 m (5' 8.27\").    Weight as of this encounter: 65.8 kg (145 lb).          reports that he has never smoked. He has never used smokeless tobacco.    Ethan Adams MD  [facility]  "

## 2020-06-25 ENCOUNTER — TRANSFERRED RECORDS (OUTPATIENT)
Dept: HEALTH INFORMATION MANAGEMENT | Facility: CLINIC | Age: 35
End: 2020-06-25

## 2020-07-30 ENCOUNTER — VIRTUAL VISIT (OUTPATIENT)
Dept: PEDIATRICS | Facility: CLINIC | Age: 35
End: 2020-07-30
Payer: COMMERCIAL

## 2020-07-30 DIAGNOSIS — F43.23 ADJUSTMENT DISORDER WITH MIXED ANXIETY AND DEPRESSED MOOD: Primary | ICD-10-CM

## 2020-07-30 DIAGNOSIS — F51.04 PSYCHOPHYSIOLOGICAL INSOMNIA: ICD-10-CM

## 2020-07-30 PROCEDURE — 96127 BRIEF EMOTIONAL/BEHAV ASSMT: CPT | Performed by: INTERNAL MEDICINE

## 2020-07-30 PROCEDURE — 99214 OFFICE O/P EST MOD 30 MIN: CPT | Mod: 95 | Performed by: INTERNAL MEDICINE

## 2020-07-30 ASSESSMENT — PATIENT HEALTH QUESTIONNAIRE - PHQ9
5. POOR APPETITE OR OVEREATING: MORE THAN HALF THE DAYS
SUM OF ALL RESPONSES TO PHQ QUESTIONS 1-9: 14

## 2020-07-30 ASSESSMENT — ANXIETY QUESTIONNAIRES
2. NOT BEING ABLE TO STOP OR CONTROL WORRYING: SEVERAL DAYS
3. WORRYING TOO MUCH ABOUT DIFFERENT THINGS: MORE THAN HALF THE DAYS
1. FEELING NERVOUS, ANXIOUS, OR ON EDGE: NEARLY EVERY DAY
6. BECOMING EASILY ANNOYED OR IRRITABLE: NEARLY EVERY DAY
GAD7 TOTAL SCORE: 13
5. BEING SO RESTLESS THAT IT IS HARD TO SIT STILL: SEVERAL DAYS
IF YOU CHECKED OFF ANY PROBLEMS ON THIS QUESTIONNAIRE, HOW DIFFICULT HAVE THESE PROBLEMS MADE IT FOR YOU TO DO YOUR WORK, TAKE CARE OF THINGS AT HOME, OR GET ALONG WITH OTHER PEOPLE: EXTREMELY DIFFICULT
7. FEELING AFRAID AS IF SOMETHING AWFUL MIGHT HAPPEN: SEVERAL DAYS

## 2020-07-30 NOTE — PROGRESS NOTES
"Zeferino Reyna is a 34 year old male who is being evaluated via a billable telephone visit.      The patient has been notified of following:     \"This telephone visit will be conducted via a call between you and your physician/provider. We have found that certain health care needs can be provided without the need for a physical exam.  This service lets us provide the care you need with a short phone conversation.  If a prescription is necessary we can send it directly to your pharmacy.  If lab work is needed we can place an order for that and you can then stop by our lab to have the test done at a later time.    Telephone visits are billed at different rates depending on your insurance coverage. During this emergency period, for some insurers they may be billed the same as an in-person visit.  Please reach out to your insurance provider with any questions.    If during the course of the call the physician/provider feels a telephone visit is not appropriate, you will not be charged for this service.\"    Patient has given verbal consent for Telephone visit?  Yes    What phone number would you like to be contacted at? [phone]     How would you like to obtain your AVS? Kinsey Hernández     Zeferino Reyna is a 34 year old male who presents via phone visit today for the following health issues:    HPI    Depression and Anxiety Follow-Up    How are you doing with your depression since your last visit? Worsened     How are you doing with your anxiety since your last visit?  Worsened     Are you having other symptoms that might be associated with depression or anxiety? Yes:  panic attacks and insomnia     Have you had a significant life event? Relationship Concerns, Housing Concerns, Grief or Loss and OTHER: working from home in isolation      Do you have any concerns with your use of alcohol or other drugs? No    Significant life changes in the past few months.  Relationship ended.  Change in work - just changed " to a new location prior to onset of the pandemic, now mainly working at home.  Changed housing.  Feeling very isolated d/t social distancing.    Not sleeping well.  Lashing out at family members, boss which is not typical for him.  Feeling anxious.    Has hx of depression and anxiety in the past.   Had been treated with medications in the past.    UVALDO-7 SCORE 6/5/2017 7/24/2017 7/30/2020   Total Score 16 12 13       PHQ 6/21/2019 2/20/2020 7/30/2020   PHQ-9 Total Score 5 10 14   Q9: Thoughts of better off dead/self-harm past 2 weeks Not at all Not at all Not at all     Has not been able to work due to symptoms.  He believed he stopped working as of July 10, 2020.    He has been discussing his mood with a counselor from work.  Has a visit with a private counselor scheduled.     Is hoping to manage symptoms without medications. Has not responded well to meds in the past.    Also is not sleeping well.  Difficulty falling asleep and often waking after 3-4 hours of sleep and cannot fall back asleep.  Has tried melatonin at lower doses.  Discussed OTC options.       Social History     Tobacco Use     Smoking status: Never Smoker     Smokeless tobacco: Never Used   Substance Use Topics     Alcohol use: Yes     Comment: 8 drinks weekly     Drug use: No     PHQ 6/21/2019 2/20/2020 7/30/2020   PHQ-9 Total Score 5 10 14   Q9: Thoughts of better off dead/self-harm past 2 weeks Not at all Not at all Not at all     UVALDO-7 SCORE 6/5/2017 7/24/2017 7/30/2020   Total Score 16 12 13          Objective   Reported vitals:  There were no vitals taken for this visit.   GEN: No distress  PSYCH: Alert, affect is normal  RESP: No cough, no audible wheezing, able to talk in full sentences  Remainder of exam unable to be completed due to telephone visit          Assessment/Plan:      ICD-10-CM    1. Adjustment disorder with mixed anxiety and depressed mood  F43.23    2. Psychophysiological insomnia  F51.04      Increase in symptoms of  adjustment disorder / depression / anxiety.  Having increased insomnia symptoms as well.    Plan:  He is enrolled in counseling, will be starting soon.  Out of work as of approx 7/11/20, does not feel he is able to return to work at this time.  Encouraged active intervention with regular exercise, eating in a healthy manner, OTC medications to help with sleep.  OK to call if he needs rx sleep medication.  If his mood worsens, consider trying rx medication for mood again.  As of today, anticipate out of work approx 4 more weeks with a return date planned 8/27/20.  If his mood improves prior to that time may be able to return sooner.     Follow-up visit scheduled 8/25/20    Phone call duration:  15 minutes    Ethan Adams MD

## 2020-07-31 ASSESSMENT — ANXIETY QUESTIONNAIRES: GAD7 TOTAL SCORE: 13

## 2020-08-20 NOTE — PROGRESS NOTES
"Pre-Visit Planning     Future Appointments   Date Time Provider Department Center   8/25/2020  8:40 AM Ethan Adams MD [facility]     Arrival Time for this Appointment:  8:40 AM   Appointment Notes for this encounter:   Phone visit [phone] -Depression / anxiety follow up    Questionnaires Reviewed/Assigned  No additional questionnaires are needed        Patient preferred phone number: [phone]      Spoke to patient via phone. Patient does not have additional questions or concerns.        Visit is not preventive.    Health Maintenance Due   Topic Date Due     ANNUAL REVIEW OF HM ORDERS  1987     DEPRESSION ACTION PLAN  1987     Patient is due for:  none  No appointment needed.    [employer]  Patient is not active on [employer]. Encouraged [employer] activation.    Questionnaire Review   Advised patient to arrive early in order to complete questionnaires.    Call Summary  \"Thank you for your time today.  If anything comes up before your appointment, please feel free to contact us at [phone].\"  "

## 2020-08-25 ENCOUNTER — VIRTUAL VISIT (OUTPATIENT)
Dept: PEDIATRICS | Facility: CLINIC | Age: 35
End: 2020-08-25
Payer: COMMERCIAL

## 2020-08-25 DIAGNOSIS — F43.23 ADJUSTMENT DISORDER WITH MIXED ANXIETY AND DEPRESSED MOOD: Primary | ICD-10-CM

## 2020-08-25 DIAGNOSIS — F98.8 ATTENTION DEFICIT DISORDER (ADD) WITHOUT HYPERACTIVITY: ICD-10-CM

## 2020-08-25 DIAGNOSIS — F32.1 MODERATE MAJOR DEPRESSION (H): ICD-10-CM

## 2020-08-25 PROCEDURE — 99213 OFFICE O/P EST LOW 20 MIN: CPT | Mod: 95 | Performed by: INTERNAL MEDICINE

## 2020-08-25 PROCEDURE — 96127 BRIEF EMOTIONAL/BEHAV ASSMT: CPT | Performed by: INTERNAL MEDICINE

## 2020-08-25 RX ORDER — DEXTROAMPHETAMINE SACCHARATE, AMPHETAMINE ASPARTATE, DEXTROAMPHETAMINE SULFATE AND AMPHETAMINE SULFATE 2.5; 2.5; 2.5; 2.5 MG/1; MG/1; MG/1; MG/1
10 TABLET ORAL DAILY PRN
Qty: 30 TABLET | Refills: 0 | Status: SHIPPED | OUTPATIENT
Start: 2020-08-25 | End: 2021-02-22

## 2020-08-25 ASSESSMENT — ANXIETY QUESTIONNAIRES
5. BEING SO RESTLESS THAT IT IS HARD TO SIT STILL: SEVERAL DAYS
1. FEELING NERVOUS, ANXIOUS, OR ON EDGE: NEARLY EVERY DAY
3. WORRYING TOO MUCH ABOUT DIFFERENT THINGS: MORE THAN HALF THE DAYS
6. BECOMING EASILY ANNOYED OR IRRITABLE: SEVERAL DAYS
2. NOT BEING ABLE TO STOP OR CONTROL WORRYING: MORE THAN HALF THE DAYS
GAD7 TOTAL SCORE: 11
IF YOU CHECKED OFF ANY PROBLEMS ON THIS QUESTIONNAIRE, HOW DIFFICULT HAVE THESE PROBLEMS MADE IT FOR YOU TO DO YOUR WORK, TAKE CARE OF THINGS AT HOME, OR GET ALONG WITH OTHER PEOPLE: VERY DIFFICULT
7. FEELING AFRAID AS IF SOMETHING AWFUL MIGHT HAPPEN: SEVERAL DAYS

## 2020-08-25 ASSESSMENT — PATIENT HEALTH QUESTIONNAIRE - PHQ9
SUM OF ALL RESPONSES TO PHQ QUESTIONS 1-9: 13
5. POOR APPETITE OR OVEREATING: SEVERAL DAYS

## 2020-08-25 NOTE — PROGRESS NOTES
"Zeferino Reyna is a 35 year old male who is being evaluated via a billable telephone visit.      The patient has been notified of following:     \"This telephone visit will be conducted via a call between you and your physician/provider. We have found that certain health care needs can be provided without the need for a physical exam.  This service lets us provide the care you need with a short phone conversation.  If a prescription is necessary we can send it directly to your pharmacy.  If lab work is needed we can place an order for that and you can then stop by our lab to have the test done at a later time.    Telephone visits are billed at different rates depending on your insurance coverage. During this emergency period, for some insurers they may be billed the same as an in-person visit.  Please reach out to your insurance provider with any questions.    If during the course of the call the physician/provider feels a telephone visit is not appropriate, you will not be charged for this service.\"    Patient has given verbal consent for Telephone visit?  Yes    What phone number would you like to be contacted at? [phone]        Subjective     Zeferino Reyna is a 35 year old male who presents via phone visit today for the following health issues:    HPI    Depression and Anxiety Follow-Up    How are you doing with your depression since your last visit? Improved - started exercising daily doing yoga.    How are you doing with your anxiety since your last visit?  No change    Are you having other symptoms that might be associated with depression or anxiety? Yes:  depression    Have you had a significant life event? No     Do you have any concerns with your use of alcohol or other drugs? No    Has been talking with a therapist weekly  Bought new furniture   Currently not taking medication for mood control     Social History     Tobacco Use     Smoking status: Never Smoker     Smokeless tobacco: Never Used "   Substance Use Topics     Alcohol use: Yes     Comment: 8 drinks weekly     Drug use: No     PHQ 2/20/2020 7/30/2020 8/25/2020   PHQ-9 Total Score 10 14 13   Q9: Thoughts of better off dead/self-harm past 2 weeks Not at all Not at all Not at all     UVALDO-7 SCORE 7/24/2017 7/30/2020 8/25/2020   Total Score 12 13 11     Last PHQ-9 8/25/2020   1.  Little interest or pleasure in doing things 1   2.  Feeling down, depressed, or hopeless 2   3.  Trouble falling or staying asleep, or sleeping too much 3   4.  Feeling tired or having little energy 3   5.  Poor appetite or overeating 1   6.  Feeling bad about yourself 2   7.  Trouble concentrating 1   8.  Moving slowly or restless 0   Q9: Thoughts of better off dead/self-harm past 2 weeks 0   PHQ-9 Total Score 13   Difficulty at work, home, or with people Very difficult     UVALDO-7  8/25/2020   1. Feeling nervous, anxious, or on edge 3   2. Not being able to stop or control worrying 2   3. Worrying too much about different things 2   4. Trouble relaxing 1   5. Being so restless that it is hard to sit still 1   6. Becoming easily annoyed or irritable 1   7. Feeling afraid, as if something awful might happen 1   UVALDO-7 Total Score 11   If you checked any problems, how difficult have they made it for you to do your work, take care of things at home, or get along with other people? Very difficult       Suicide Assessment Five-step Evaluation and Treatment (SAFE-T)      How many servings of fruits and vegetables do you eat daily?  0-1    On average, how many sweetened beverages do you drink each day (Examples: soda, juice, sweet tea, etc.  Do NOT count diet or artificially sweetened beverages)?   1    How many days per week do you exercise enough to make your heart beat faster? 5    How many minutes a day do you exercise enough to make your heart beat faster? 30 - 60    How many days per week do you miss taking your medication? 0    ADD. Treating with Adderall.  Working well, taking  sporadically.             Objective          Vitals:  No vitals were obtained today due to virtual visit.    GEN: No distress  PSYCH: Alert, affect is normal  RESP: No cough, no audible wheezing, able to talk in full sentences  Remainder of exam unable to be completed due to telephone visit            Assessment/Plan:      ICD-10-CM    1. Adjustment disorder with mixed anxiety and depressed mood  F43.23    2. Attention deficit disorder (ADD) without hyperactivity  F98.8 amphetamine-dextroamphetamine (ADDERALL) 10 MG tablet   3. Moderate major depression (H)  F32.1      Continue w/ counseling  Call if he needs f/u or med discussion or if sx worsen, otherwise f/u next year with routine visit.    Ethan Adams MD     Phone call duration:  5 minutes

## 2020-08-26 ASSESSMENT — ANXIETY QUESTIONNAIRES: GAD7 TOTAL SCORE: 11

## 2020-09-24 ENCOUNTER — TELEPHONE (OUTPATIENT)
Dept: PEDIATRICS | Facility: CLINIC | Age: 35
End: 2020-09-24

## 2020-09-24 NOTE — TELEPHONE ENCOUNTER
Forms/Letter Request    Name of form/letter: FMLA     Have you been seen for this request: No- Pt state he discussed at last visit    Do we have the form/letter: Yes: placed in provider's inbasket    When is form/letter needed by: ASAP    How would you like the form/letter returned:     Patient Notified form requests are processed in 3-5 business days:Yes    Okay to leave a detailed message? Yes Cell number on file:    Telephone Information:   Mobile [phone]       Additional comments:  Patient states he has not been at work all week due to his anxiety and depression.  Patient states he thinks he went back to work too early, and would like to go on FMLA.  Patient states he previously discussed with Dr. Adams.  Patient is scheduled for telephone visit 9/29/20 at 2:50 pm to discuss in further detail, however patient is hoping to have paperwork completed sooner.

## 2020-09-25 ENCOUNTER — MYC MEDICAL ADVICE (OUTPATIENT)
Dept: PEDIATRICS | Facility: CLINIC | Age: 35
End: 2020-09-25

## 2020-09-25 NOTE — TELEPHONE ENCOUNTER
Forms faxed to [facility] [employer]ability [phone], and copy sent to abstracting.  Phone visit also cancelled.  Patient notified.    Radha No

## 2020-09-25 NOTE — TELEPHONE ENCOUNTER
Left message for patient informing him forms have been completed.  Inquired on how he would like forms sent back to him. Requested a call back and provided clinic number.      Radha No

## 2020-10-20 ENCOUNTER — VIRTUAL VISIT (OUTPATIENT)
Dept: PEDIATRICS | Facility: CLINIC | Age: 35
End: 2020-10-20
Payer: COMMERCIAL

## 2020-10-20 DIAGNOSIS — F43.23 ADJUSTMENT DISORDER WITH MIXED ANXIETY AND DEPRESSED MOOD: Primary | ICD-10-CM

## 2020-10-20 PROCEDURE — 99213 OFFICE O/P EST LOW 20 MIN: CPT | Mod: 95 | Performed by: INTERNAL MEDICINE

## 2020-10-20 NOTE — PROGRESS NOTES
"Zeferino Reyna is a 35 year old male who is being evaluated via a billable telephone visit.      The patient has been notified of following:     \"This telephone visit will be conducted via a call between you and your physician/provider. We have found that certain health care needs can be provided without the need for a physical exam.  This service lets us provide the care you need with a short phone conversation.  If a prescription is necessary we can send it directly to your pharmacy.  If lab work is needed we can place an order for that and you can then stop by our lab to have the test done at a later time.    Telephone visits are billed at different rates depending on your insurance coverage. During this emergency period, for some insurers they may be billed the same as an in-person visit.  Please reach out to your insurance provider with any questions.    If during the course of the call the physician/provider feels a telephone visit is not appropriate, you will not be charged for this service.\"    Patient has given verbal consent for Telephone visit?  Yes    What phone number would you like to be contacted at? [phone]      Subjective     Zeferino Reyna is a 35 year old male who presents via phone visit today for the following health issues:    HPI      Anxiety and depression sx.  Missed work 9/21-9/27/20 due to increased symptoms.  He returned to work, noted increased panic attacks.  Has not been able to work other than 3 days since 9/27/20.    Took a few days vacation.    Limiting alcohol intake  Exercising regularly, yoga daily  Continues with counselor weekly    Still not taking medications to treat anxiety.  Has taken medications in the past w/o good outcome.    His goal is to have a continuation of his leave.    He is unsure if his leave should be from 9/21 or his original date of 7/11.  At this point he would like to contact his insurance / HR and get clarification as to what documentation needs to " be done.    He will contact me once he has the additional information.           Objective      Vitals:  No vitals were obtained today due to virtual visit.    GEN: No distress  PSYCH: Alert, affect is somewhat anxious at times  RESP: No cough, no audible wheezing, able to talk in full sentences  Remainder of exam unable to be completed due to telephone visit           Assessment/Plan:      ICD-10-CM    1. Adjustment disorder with mixed anxiety and depressed mood  F43.23       Having ongoing / increased sx of anxiety and depression.  He has not been able to work other than a few days in the past month.  He needs an extension of his short term disability due to his symptoms.  Tried to determine whether his disability is to start from his first episode in July or relate to the end of his disability time off last month. He is uncertain and will need to contact his HR department.  He can call back and/or send forms as needed to extend his time.  Encouraged continuation of his scheduled counseling visits.  Offered medications, again he relates significant difficulty with multiple different medications tried in the past and does not want to start a daily medication at this time.    Phone call duration:  12 minutes

## 2020-11-16 ENCOUNTER — HEALTH MAINTENANCE LETTER (OUTPATIENT)
Age: 35
End: 2020-11-16

## 2021-02-22 DIAGNOSIS — F98.8 ATTENTION DEFICIT DISORDER (ADD) WITHOUT HYPERACTIVITY: ICD-10-CM

## 2021-02-22 RX ORDER — DEXTROAMPHETAMINE SACCHARATE, AMPHETAMINE ASPARTATE, DEXTROAMPHETAMINE SULFATE AND AMPHETAMINE SULFATE 2.5; 2.5; 2.5; 2.5 MG/1; MG/1; MG/1; MG/1
10 TABLET ORAL DAILY PRN
Qty: 30 TABLET | Refills: 0 | Status: SHIPPED | OUTPATIENT
Start: 2021-02-22 | End: 2021-08-25

## 2021-02-22 NOTE — TELEPHONE ENCOUNTER
Routing refill request to provider for review/approval because:  Drug not on the [facility] refill protocol     Kathi Spain RN   [facility] -- Triage Nurse

## 2021-02-22 NOTE — TELEPHONE ENCOUNTER
Medication Question or Refill    Who is calling: patient    What medication are you calling about (include dose and sig)?:     amphetamine-dextroamphetamine (ADDERALL) 10 MG tablet [30994]      Controlled Substance Agreement on file: Yes: - non-opiod agreement 2/27/20    Who prescribed the medication?: Dr. Adams    Do you need a refill? Yes: - Patient states he never filled initial Rx d/t multiple issues with his pharmacy.  Patient is hoping to get Rx filled so he can start w/o OV.    When did you use the medication last? n/a-    Patient offered an appointment? No    Do you have any questions or concerns?  No    Requested Pharmacy:    [facility]    Okay to leave a detailed message?: Yes at Cell number on file:    Telephone Information:   Mobile [phone]

## 2021-05-12 ENCOUNTER — TELEPHONE (OUTPATIENT)
Dept: PEDIATRICS | Facility: CLINIC | Age: 36
End: 2021-05-12

## 2021-05-12 NOTE — TELEPHONE ENCOUNTER
Patient Quality Outreach      Summary:    Patient has the following on his problem list/HM:     Depression / Dysthymia review           PHQ-9 SCORE 2/20/2020 7/30/2020 8/25/2020   PHQ-9 Total Score MyChart 10 (Moderate depression) - -   PHQ-9 Total Score 10 14 13       If PHQ-9 recheck is 5 or more, route to provider for next steps.    Patient is due/failing the following:   PHQ-9 Needed, Depression follow-up visit and DAP and Adult/Adolescent physical, date due: March 2021    Type of outreach:    Sent [employer] message.    Questions for provider review:    None                                                                                                                                     MAHOGANY MAHER MA on 5/12/2021 at 8:47 AM

## 2021-05-29 ENCOUNTER — HEALTH MAINTENANCE LETTER (OUTPATIENT)
Age: 36
End: 2021-05-29

## 2021-08-25 ENCOUNTER — TELEPHONE (OUTPATIENT)
Dept: PHARMACY | Facility: PHYSICIAN GROUP | Age: 36
End: 2021-08-25
Payer: MEDICAID

## 2021-08-25 DIAGNOSIS — Z53.9 ERRONEOUS ENCOUNTER--DISREGARD: Primary | ICD-10-CM

## 2021-08-25 DIAGNOSIS — F98.8 ATTENTION DEFICIT DISORDER (ADD) WITHOUT HYPERACTIVITY: ICD-10-CM

## 2021-08-25 RX ORDER — DEXTROAMPHETAMINE SACCHARATE, AMPHETAMINE ASPARTATE, DEXTROAMPHETAMINE SULFATE AND AMPHETAMINE SULFATE 2.5; 2.5; 2.5; 2.5 MG/1; MG/1; MG/1; MG/1
10 TABLET ORAL DAILY PRN
Qty: 30 TABLET | Refills: 0 | Status: SHIPPED | OUTPATIENT
Start: 2021-08-25 | End: 2022-04-19

## 2021-08-25 RX ORDER — DEXTROAMPHETAMINE SACCHARATE, AMPHETAMINE ASPARTATE MONOHYDRATE, DEXTROAMPHETAMINE SULFATE AND AMPHETAMINE SULFATE 5; 5; 5; 5 MG/1; MG/1; MG/1; MG/1
20 CAPSULE, EXTENDED RELEASE ORAL DAILY
Qty: 30 CAPSULE | Refills: 0 | Status: SHIPPED | OUTPATIENT
Start: 2021-08-25 | End: 2022-04-19

## 2021-08-25 ASSESSMENT — PATIENT HEALTH QUESTIONNAIRE - PHQ9: SUM OF ALL RESPONSES TO PHQ QUESTIONS 1-9: 12

## 2021-08-25 NOTE — TELEPHONE ENCOUNTER
Patient call requesting refill of Adderal 10mg. Patient is also requesting to go back on the amphetamine-dextroamphetamine (ADDERALL XR) 20 MG PO 24 hr capsule as they lost their job and the realized they keya need to to help them focus on finding a new job.    Routing to provider for review.    Fabian Jolley at 10:26 AM on 8/25/2021  [facility]  Phone [phone]

## 2021-08-25 NOTE — TELEPHONE ENCOUNTER
"[facility] call to inform of med refill and to assist with scheduling physical and PHQ-9. Physical scheduled for 10/08/2021 at 1350.    Patient says they are not currently in therapy because they were not a good fit with their last one but they are \"shopping around\" for a new one. [facility] informed patient of in clinic [facility] [place] and informed them they can see them if needed.    Fabian Jolley at 11:54 AM on 8/25/2021  [facility] Guide  Phone [phone]    "

## 2021-09-18 ENCOUNTER — HEALTH MAINTENANCE LETTER (OUTPATIENT)
Age: 36
End: 2021-09-18

## 2021-09-24 ENCOUNTER — TELEPHONE (OUTPATIENT)
Dept: PEDIATRICS | Facility: CLINIC | Age: 36
End: 2021-09-24

## 2021-09-24 NOTE — TELEPHONE ENCOUNTER
[facility] call to reschedule physical. appointment time changed to 1300 on 10/08/2021.    Fabian Jolley at 3:55 PM on 9/24/2021  [facility] Guide  Phone [phone]

## 2021-10-07 ENCOUNTER — TELEPHONE (OUTPATIENT)
Dept: PEDIATRICS | Facility: CLINIC | Age: 36
End: 2021-10-07

## 2021-10-07 NOTE — TELEPHONE ENCOUNTER
"Pre-Visit Planning   Next 5 appointments (look out 90 days)    Oct 08, 2021  1:10 PM  (Arrive by 12:45 PM)  Adult Preventative Visit with Ethan Adams MD  [facility] ([facility] ) [address] [place] [address] [address]  [place] [address] [phone]        Appointment Notes for this encounter:   Data Unavailable    Questionnaires Reviewed/Assigned  No additional questionnaires are needed      Patient preferred phone number: [phone]      Spoke to patient via phone. Patient does not have additional questions or concerns.        Visit is preventive. Reviewed purpose of preventive visit with patient.    Patient is established.    Patient reminded of date and time. Barriers addressed: NA    Health Maintenance Due   Topic Date Due     ANNUAL REVIEW OF HM ORDERS  Never done     ADVANCE CARE PLANNING  Never done     DEPRESSION ACTION PLAN  Never done     PREVENTIVE CARE VISIT  03/17/2021     INFLUENZA VACCINE (1) 09/01/2021     Patient is due for:  na  No appointment needed.    [employer]  Patient is active on [employer].    Questionnaire Review   Offered information on completing questionnaires via [employer].    Call Summary  \"Thank you for your time today.  If anything comes up before your appointment, please feel free to contact us at [phone].\"    Fabian Jolley at 1:49 PM on 10/7/2021  [facility] Health Guide  Phone [phone]    "

## 2021-10-19 PROBLEM — F32.9 MAJOR DEPRESSION: Status: ACTIVE | Noted: 2017-10-03

## 2022-04-18 ASSESSMENT — PATIENT HEALTH QUESTIONNAIRE - PHQ9
SUM OF ALL RESPONSES TO PHQ QUESTIONS 1-9: 12
10. IF YOU CHECKED OFF ANY PROBLEMS, HOW DIFFICULT HAVE THESE PROBLEMS MADE IT FOR YOU TO DO YOUR WORK, TAKE CARE OF THINGS AT HOME, OR GET ALONG WITH OTHER PEOPLE: VERY DIFFICULT
SUM OF ALL RESPONSES TO PHQ QUESTIONS 1-9: 12

## 2022-04-19 ENCOUNTER — VIRTUAL VISIT (OUTPATIENT)
Dept: PEDIATRICS | Facility: CLINIC | Age: 37
End: 2022-04-19
Payer: MEDICAID

## 2022-04-19 DIAGNOSIS — F98.8 ATTENTION DEFICIT DISORDER (ADD) WITHOUT HYPERACTIVITY: Primary | ICD-10-CM

## 2022-04-19 DIAGNOSIS — F33.0 MILD EPISODE OF RECURRENT MAJOR DEPRESSIVE DISORDER (H): ICD-10-CM

## 2022-04-19 DIAGNOSIS — F51.04 PSYCHOPHYSIOLOGICAL INSOMNIA: ICD-10-CM

## 2022-04-19 DIAGNOSIS — L30.9 DERMATITIS: ICD-10-CM

## 2022-04-19 PROCEDURE — 99442 PR PHYSICIAN TELEPHONE EVALUATION 11-20 MIN: CPT | Performed by: INTERNAL MEDICINE

## 2022-04-19 RX ORDER — ZOLPIDEM TARTRATE 10 MG/1
10 TABLET ORAL
Qty: 30 TABLET | Refills: 1 | Status: SHIPPED | OUTPATIENT
Start: 2022-04-19 | End: 2023-01-03

## 2022-04-19 RX ORDER — KETOCONAZOLE 20 MG/G
CREAM TOPICAL DAILY
Qty: 60 G | Refills: 1 | Status: SHIPPED | OUTPATIENT
Start: 2022-04-19 | End: 2023-01-03

## 2022-04-19 RX ORDER — BETAMETHASONE DIPROPIONATE 0.5 MG/G
CREAM TOPICAL 2 TIMES DAILY
Qty: 45 G | Refills: 1 | Status: SHIPPED | OUTPATIENT
Start: 2022-04-19 | End: 2023-01-03

## 2022-04-19 RX ORDER — DEXTROAMPHETAMINE SACCHARATE, AMPHETAMINE ASPARTATE MONOHYDRATE, DEXTROAMPHETAMINE SULFATE AND AMPHETAMINE SULFATE 5; 5; 5; 5 MG/1; MG/1; MG/1; MG/1
20 CAPSULE, EXTENDED RELEASE ORAL DAILY
Qty: 30 CAPSULE | Refills: 0 | Status: SHIPPED | OUTPATIENT
Start: 2022-04-19 | End: 2022-08-01

## 2022-04-19 RX ORDER — DEXTROAMPHETAMINE SACCHARATE, AMPHETAMINE ASPARTATE, DEXTROAMPHETAMINE SULFATE AND AMPHETAMINE SULFATE 2.5; 2.5; 2.5; 2.5 MG/1; MG/1; MG/1; MG/1
10 TABLET ORAL DAILY PRN
Qty: 30 TABLET | Refills: 0 | Status: SHIPPED | OUTPATIENT
Start: 2022-04-19 | End: 2022-08-01

## 2022-04-19 ASSESSMENT — PATIENT HEALTH QUESTIONNAIRE - PHQ9
SUM OF ALL RESPONSES TO PHQ QUESTIONS 1-9: 12
10. IF YOU CHECKED OFF ANY PROBLEMS, HOW DIFFICULT HAVE THESE PROBLEMS MADE IT FOR YOU TO DO YOUR WORK, TAKE CARE OF THINGS AT HOME, OR GET ALONG WITH OTHER PEOPLE: VERY DIFFICULT

## 2022-04-19 NOTE — PROGRESS NOTES
Zeferino is a 36 year old who is being evaluated via a billable telephone  visit.    Pt could not log in through [facility]    Assessment & Plan       ICD-10-CM    1. Attention deficit disorder (ADD) without hyperactivity  F98.8 amphetamine-dextroamphetamine (ADDERALL XR) 20 MG 24 hr capsule     amphetamine-dextroamphetamine (ADDERALL) 10 MG tablet   2. Dermatitis  L30.9 betamethasone dipropionate (DIPROSONE) 0.05 % external cream     ketoconazole (NIZORAL) 2 % external cream   3. Mild episode of recurrent major depressive disorder (H)  F33.0    4. Psychophysiological insomnia  F51.04 zolpidem (AMBIEN) 10 MG tablet     Several issues reviewed today.  ADD. Not currently using medications d/t cost and insurance issues. He would like to restart. Kept doses the same - 20 mg XR daily with 10 mg regular if needed.    Dermatitis - concern for fungal cause as current treatments have not resolved. Recommend combination of ketoconazole plus betamethasone. If sx do not improve, may need derm evaluation.    Insomnia. Ongoing issue. Trial of Ambien. Take nightly x 2 weeks then prn.     Ethan Adams MD  [facility] JOSIE Mcgarry is a 36 year old who presents for the following health issues     History of Present Illness       Mental Health Follow-up:                    Today's PHQ-9         PHQ-9 Total Score: 12  PHQ-9 Q9 Thoughts of better off dead/self-harm past 2 weeks :   (P) Not at all    How difficult have these problems made it for you to do your work, take care of things at home, or get along with other people: Very difficult        Reason for visit:  Prescritpion for Adderal  Symptom onset:  More than a month  Symptoms include:  Im diagnosed with ADD  Symptom intensity:  Moderate  Symptom progression:  Staying the same  Had these symptoms before:  Yes  Has tried/received treatment for these symptoms:  Yes  Previous treatment was successful:  Yes  Prior treatment description:  Adderal  What makes it  worse:  No  What makes it better:  My medication alleviates my symptoms    He eats 0-1 servings of fruits and vegetables daily.He consumes 1 sweetened beverage(s) daily.He exercises with enough effort to increase his heart rate 30 to 60 minutes per day.  He exercises with enough effort to increase his heart rate 5 days per week. He is missing 7 dose(s) of medications per week.  He is not taking prescribed medications regularly due to cost of medication.     Had not been filling Adderall due to insurance issues.  He would like to restart at his prior dose.  Was taking Adderall XR 20 mg daily with 10 mg regular prn.    Also red, flaky skin on the bottom of his foot.  Is using OTC medication for psoriasis:   - salicylic acid 2%.    In the past used betamethasone cream which helped, used for 1 week.    Also difficulty w/ sleep.  Cannot fall asleep well.  Has used Ambien in the past and has worked well.        Objective           Vitals:  No vitals were obtained today due to virtual visit.    Physical Exam   GEN: No distress  PSYCH: Alert, affect is normal  RESP: No cough, no audible wheezing, able to talk in full sentences  Remainder of exam unable to be completed due to telephone visit        Total telephone time: 12 minutes

## 2022-06-25 ENCOUNTER — HEALTH MAINTENANCE LETTER (OUTPATIENT)
Age: 37
End: 2022-06-25

## 2022-08-01 ENCOUNTER — MYC REFILL (OUTPATIENT)
Dept: PEDIATRICS | Facility: CLINIC | Age: 37
End: 2022-08-01

## 2022-08-01 DIAGNOSIS — F98.8 ATTENTION DEFICIT DISORDER (ADD) WITHOUT HYPERACTIVITY: ICD-10-CM

## 2022-08-02 RX ORDER — DEXTROAMPHETAMINE SACCHARATE, AMPHETAMINE ASPARTATE MONOHYDRATE, DEXTROAMPHETAMINE SULFATE AND AMPHETAMINE SULFATE 5; 5; 5; 5 MG/1; MG/1; MG/1; MG/1
20 CAPSULE, EXTENDED RELEASE ORAL DAILY
Qty: 30 CAPSULE | Refills: 0 | Status: SHIPPED | OUTPATIENT
Start: 2022-08-02 | End: 2022-08-05

## 2022-08-02 RX ORDER — DEXTROAMPHETAMINE SACCHARATE, AMPHETAMINE ASPARTATE, DEXTROAMPHETAMINE SULFATE AND AMPHETAMINE SULFATE 2.5; 2.5; 2.5; 2.5 MG/1; MG/1; MG/1; MG/1
10 TABLET ORAL DAILY PRN
Qty: 30 TABLET | Refills: 0 | Status: SHIPPED | OUTPATIENT
Start: 2022-08-02 | End: 2022-08-05

## 2022-08-02 NOTE — TELEPHONE ENCOUNTER
Routing refill request to provider for review/approval because:  Drug not on the [facility] refill protocol     Rosendo WARE RN

## 2022-08-05 DIAGNOSIS — F98.8 ATTENTION DEFICIT DISORDER (ADD) WITHOUT HYPERACTIVITY: ICD-10-CM

## 2022-08-05 RX ORDER — DEXTROAMPHETAMINE SACCHARATE, AMPHETAMINE ASPARTATE MONOHYDRATE, DEXTROAMPHETAMINE SULFATE AND AMPHETAMINE SULFATE 5; 5; 5; 5 MG/1; MG/1; MG/1; MG/1
20 CAPSULE, EXTENDED RELEASE ORAL DAILY
Qty: 30 CAPSULE | Refills: 0 | Status: SHIPPED | OUTPATIENT
Start: 2022-08-05 | End: 2023-01-03

## 2022-08-05 RX ORDER — DEXTROAMPHETAMINE SACCHARATE, AMPHETAMINE ASPARTATE, DEXTROAMPHETAMINE SULFATE AND AMPHETAMINE SULFATE 2.5; 2.5; 2.5; 2.5 MG/1; MG/1; MG/1; MG/1
10 TABLET ORAL DAILY PRN
Qty: 30 TABLET | Refills: 0 | Status: SHIPPED | OUTPATIENT
Start: 2022-08-05 | End: 2023-01-03

## 2022-08-05 NOTE — TELEPHONE ENCOUNTER
Pt calling because he was not able to  adderall prescriptions from the [facility] they were sent to.  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  I confirmed with this pharmacy that the prescriptions were not picked up.  Please [facility]

## 2022-11-18 ENCOUNTER — TELEPHONE (OUTPATIENT)
Dept: PEDIATRICS | Facility: CLINIC | Age: 37
End: 2022-11-18

## 2022-11-18 ENCOUNTER — E-VISIT (OUTPATIENT)
Dept: URGENT CARE | Facility: CLINIC | Age: 37
End: 2022-11-18
Payer: COMMERCIAL

## 2022-11-18 DIAGNOSIS — R05.1 ACUTE COUGH: Primary | ICD-10-CM

## 2022-11-18 PROCEDURE — 99421 OL DIG E/M SVC 5-10 MIN: CPT | Mod: CS | Performed by: INTERNAL MEDICINE

## 2022-11-18 RX ORDER — ALBUTEROL SULFATE 90 UG/1
2 AEROSOL, METERED RESPIRATORY (INHALATION) EVERY 4 HOURS PRN
Qty: 18 G | Refills: 0 | Status: SHIPPED | OUTPATIENT
Start: 2022-11-18 | End: 2023-07-10

## 2022-11-18 NOTE — TELEPHONE ENCOUNTER
Called and spoke with patient. Advised that given symptoms are likely viral per e-visit and he should be seen in person for further evaluation. Patient verbalized understanding.   Informed that inhaler was prescribed in e-visit. He was unaware of this. Will pick this up from pharmacy and trial over the weekend. If no improvement will be seen in person.     Claire Rider BSN, RN

## 2022-11-18 NOTE — PATIENT INSTRUCTIONS
Dear Zeferino,      Based on your responses, you likely have influenza.  You could have coronavirus (COVID-19) so we will test you for that.  As you have had symptoms for more than 2 days, it is not beneficial to test for influenza as there is not treatment after 2 days for that. I have also sent a prescription for an albuterol inhaler to your pharmacy as this may help your cough.    Will I be tested for COVID-19?  We would like to test you for COVID. I have placed orders for this test.     To schedule: go to your [email or web address] home page and scroll down to the section that says  You have an appointment that needs to be scheduled  and click the large green button that says  Schedule Now  and follow the steps to find the next available openings.    If you are unable to complete these [email or web address] scheduling steps, please call [phone] to schedule your testing.     These guidelines are for isolating before returning to work, school or .     For employers, schools and day cares: This is an official notice for this person s medical guidelines for returning in-person.     For health care sites: The CDC gives different isolation and quarantine guidelines for healthcare sites, please check with these sites before arriving.     How do I self-isolate?  You isolate when you have symptoms of COVID or a test shows you have COVID, even if you don t have symptoms.     If you DO have symptoms:  o Stay home and away from others  - For at least 5 days after your symptoms started, AND   - You are fever free for 24 hours (with no medicine that reduces fever), AND  - Your other symptoms are better.  o Wear a mask for 10 full days any time you are around others.    If you DON T have symptoms:  o Stay at home and away from others for at least 5 days after your positive test.  o Wear a mask for 10 full days any time you are around others.    How can I take care of myself?  Over the counter medications may help with your symptoms such as  runny or stuffy nose, cough, chills, or fever.  Talk to your care team about your options.     Some people are at high risk of severe illness (for example, you have a weak immune system, you re 65 years or older, or you have certain medical problems). If your risk is high and your symptoms started in the last 5 to 7 days, we strongly recommend for you to get COVID treatment as soon as possible. Paxlovid, Molnupiravir and the monoclonal antibody treatments are proven safe and effective, make you feel better faster, and prevent hospitalization and death.       To schedule an appointment to discuss COVID treatment, request an appointment on [facility] (select  COVID-19 Treatment ) or call [employer] ([phone]), press 7.      Get lots of rest. Drink extra fluids (unless a doctor has told you not to)    Take Tylenol (acetaminophen) or ibuprofen for fever or pain. If you have liver or kidney problems, ask your family doctor if it's okay to take Tylenol or ibuprofen    Take over the counter medications for your symptoms, as directed by your doctor. You may also talk to your pharmacist.      If you have other health problems (like cancer, heart failure, an organ transplant or severe kidney disease): Call your specialty clinic if you don't feel better in the next 2 days.    Know when to call 911. Emergency warning signs include:  o Trouble breathing or shortness of breath  o Pain or pressure in the chest that doesn't go away  o Feeling confused like you haven't felt before, or not being able to wake up  o Bluish-colored lips or face    Where can I get more information?    [facility] [place] - About COVID-19: [email or web address]/covid19/     CDC - What to Do If You're Sick: https://www.cdc.gov/coronavirus/2019-ncov/if-you-are-sick/index.html     CDC - Quarantine & Isolation: https://www.cdc.gov/coronavirus/2019-ncov/your-health/quarantine-isolation.html     [facility] clinical trials (COVID-19 research  studies): clinicalaffairs.[facility]-clinical-trials    Below are the COVID-19 hotlines at the [place] [facility] ([facility]). Interpreters are available.  o For health questions: Call 560-097-4421 or 1-813.696.8173 (7 a.m. to 7 p.m.)  o For questions about schools and childcare: Call 834-816-6138 or 1-641.397.4900 (7 a.m. to 7 p.m.)

## 2022-11-18 NOTE — TELEPHONE ENCOUNTER
Per J:    Patient needs to be seen in-person and not virtually since he had an e-visit today and most likely diagnosed with Influenza.    MORIAH JacksonN, RN  [facility]  November 18, 2022

## 2022-11-19 ENCOUNTER — HEALTH MAINTENANCE LETTER (OUTPATIENT)
Age: 37
End: 2022-11-19

## 2023-01-03 ENCOUNTER — MYC REFILL (OUTPATIENT)
Dept: PEDIATRICS | Facility: CLINIC | Age: 38
End: 2023-01-03

## 2023-01-03 DIAGNOSIS — F98.8 ATTENTION DEFICIT DISORDER (ADD) WITHOUT HYPERACTIVITY: ICD-10-CM

## 2023-01-03 RX ORDER — DEXTROAMPHETAMINE SACCHARATE, AMPHETAMINE ASPARTATE MONOHYDRATE, DEXTROAMPHETAMINE SULFATE AND AMPHETAMINE SULFATE 5; 5; 5; 5 MG/1; MG/1; MG/1; MG/1
20 CAPSULE, EXTENDED RELEASE ORAL DAILY
Qty: 30 CAPSULE | Refills: 0 | Status: SHIPPED | OUTPATIENT
Start: 2023-01-03 | End: 2023-04-26

## 2023-01-03 RX ORDER — DEXTROAMPHETAMINE SACCHARATE, AMPHETAMINE ASPARTATE, DEXTROAMPHETAMINE SULFATE AND AMPHETAMINE SULFATE 2.5; 2.5; 2.5; 2.5 MG/1; MG/1; MG/1; MG/1
10 TABLET ORAL DAILY PRN
Qty: 30 TABLET | Refills: 0 | Status: SHIPPED | OUTPATIENT
Start: 2023-01-03 | End: 2023-04-26

## 2023-04-26 ENCOUNTER — MYC REFILL (OUTPATIENT)
Dept: PEDIATRICS | Facility: CLINIC | Age: 38
End: 2023-04-26
Payer: COMMERCIAL

## 2023-04-26 DIAGNOSIS — F98.8 ATTENTION DEFICIT DISORDER (ADD) WITHOUT HYPERACTIVITY: ICD-10-CM

## 2023-04-26 RX ORDER — DEXTROAMPHETAMINE SACCHARATE, AMPHETAMINE ASPARTATE, DEXTROAMPHETAMINE SULFATE AND AMPHETAMINE SULFATE 2.5; 2.5; 2.5; 2.5 MG/1; MG/1; MG/1; MG/1
10 TABLET ORAL DAILY PRN
Qty: 30 TABLET | Refills: 0 | Status: SHIPPED | OUTPATIENT
Start: 2023-04-26 | End: 2023-07-10

## 2023-04-26 RX ORDER — DEXTROAMPHETAMINE SACCHARATE, AMPHETAMINE ASPARTATE MONOHYDRATE, DEXTROAMPHETAMINE SULFATE AND AMPHETAMINE SULFATE 5; 5; 5; 5 MG/1; MG/1; MG/1; MG/1
20 CAPSULE, EXTENDED RELEASE ORAL DAILY
Qty: 30 CAPSULE | Refills: 0 | Status: SHIPPED | OUTPATIENT
Start: 2023-04-26 | End: 2023-07-10

## 2023-05-11 NOTE — PROGRESS NOTES
Chief Complaint - Nasal obstruction    History of Present Illness - Zeferino Reyna is a 37 year old male who presents for evaluation of nasal obstruction. The patient describes symptoms of nasal obstruction for the past many years. Also has difficulty sleeping. When he lies down he has postnasal drainage, worse nasal obstruction. The patient notes some allergies. Treatments have included breathe right strips, nasal irrigations, nasal steroids, and oral antihistamines. The treatments seem to not help much. Only afrin helps. + history of nasal trauma. No prior history of nasal surgery.    Past Medical History -   Patient Active Problem List   Diagnosis     ADD (attention deficit disorder)     Chondromalacia patellae     CARDIOVASCULAR SCREENING; LDL GOAL LESS THAN 160     Adjustment disorder with mixed anxiety and depressed mood     Moderate major depression (H)       Current Medications -   Current Outpatient Medications:      albuterol (PROAIR HFA/PROVENTIL HFA/VENTOLIN HFA) 108 (90 Base) MCG/ACT inhaler, Inhale 2 puffs into the lungs every 4 hours as needed for shortness of breath / dyspnea, wheezing or other (cough), Disp: 18 g, Rfl: 0     amphetamine-dextroamphetamine (ADDERALL XR) 20 MG 24 hr capsule, Take 1 capsule (20 mg) by mouth daily, Disp: 30 capsule, Rfl: 0     amphetamine-dextroamphetamine (ADDERALL) 10 MG tablet, Take 1 tablet (10 mg) by mouth daily as needed (inability to focus), Disp: 30 tablet, Rfl: 0     Multiple Vitamins-Minerals (MULTIVITAMIN OR), Take 1 tablet by mouth daily, Disp: , Rfl:      Omega-3 Fatty Acids (OMEGA-3 FISH OIL PO), Take 1 tablet by mouth daily, Disp: , Rfl:     Allergies -   Allergies   Allergen Reactions     Codeine Sulfate Nausea and Vomiting     Codeine Nausea and Vomiting       Social History -   Social History     Socioeconomic History     Marital status: Single   Tobacco Use     Smoking status: Never     Smokeless tobacco: Never   Substance and Sexual Activity      Alcohol use: Yes     Comment: 8 drinks weekly     Drug use: No     Sexual activity: Not Currently     Partners: Female     Birth control/protection: Condom   Other Topics Concern     Caffeine Concern No     Occupational Exposure No     Hobby Hazards No     Sleep Concern No     Stress Concern Yes     Weight Concern No     Special Diet No     Back Care No     Exercise Yes     Comment: 2 X WEEK     Seat Belt Yes     Parent/sibling w/ CABG, MI or angioplasty before 65F 55M? No   Social History Narrative    Lives in [place].        Family History -   Family History   Problem Relation Age of Onset     No Known Problems Mother      Anxiety Disorder Father      Cerebrovascular Disease Maternal Grandmother      Breast Cancer Paternal Grandmother      GERD Brother 30     No Known Problems Maternal Grandfather      GERD Brother 30     No Known Problems Paternal Grandfather      Physical Exam  General - The patient is in no distress. Alert and oriented to person and place, answers questions and cooperates with examination appropriately.   Neurologic - CN II-XII are grossly intact. No focal neurologic deficits.   Voice and Breathing - The patient was breathing comfortably without the use of accessory muscles. There was no wheezing, stridor, or stertor.  The patients voice was clear and strong.  Eyes - Extraocular movements intact. Sclera were not icteric or injected, conjunctiva were pink and moist.  Mouth - Examination of the oral cavity showed pink, healthy oral mucosa. No lesions or ulcerations noted.  The tongue was mobile and midline, and the dentition were in good condition.    Throat - The walls of the oropharynx were smooth, pink, moist, symmetric, and had no lesions or ulcerations.  The tonsillar pillars and soft palate were symmetric.  The uvula was midline on elevation.  Neck -  Soft, non-tender. Palpation of the occipital, submental, submandibular, internal jugular chain, and supraclavicular nodes did  not demonstrate any abnormal lymph nodes or masses. No parotid masses. Palpation of the thyroid was soft and smooth, with no nodules or goiter appreciated.  The trachea was mobile and midline.  Nose - External contour is symmetric, no gross deflection or scars.  Nasal mucosa is pink and moist with clear mucus. However the turbinates are hypertrophic. The septum was deviated to the left and obstructive.  No polyps, masses, or purulence noted on examination.      A/P - Zeferino Reyna is a 37 year old male with nasal obstruction due to septal deviation and turbinate hypertrophy. He has tried and failed antihistamines, nasal steroids, saline irrigations, breathe right strips. Only afrin helps, but he gets rebound. Medical management failed, and I recommend nasal surgery in the form of septoplasty with inferior turbinate reduction and out-fracture.    I counseled the patient on the risks of surgery, including infection, bleeding, risks of general anesthesia, the risk of failure of the surgery to relieve nasal obstruction, the possible need for additional procedures, the possible need for additional medical therapy, and the possibility of alteration of the appearance of the external nose. They understood and wished to proceed to scheduling.      Stevenson Patel MD  Otolaryngology  [facility]

## 2023-05-12 ENCOUNTER — OFFICE VISIT (OUTPATIENT)
Dept: OTOLARYNGOLOGY | Facility: CLINIC | Age: 38
End: 2023-05-12
Payer: COMMERCIAL

## 2023-05-12 ENCOUNTER — TELEPHONE (OUTPATIENT)
Dept: OTOLARYNGOLOGY | Facility: CLINIC | Age: 38
End: 2023-05-12

## 2023-05-12 VITALS
WEIGHT: 140 LBS | HEART RATE: 90 BPM | BODY MASS INDEX: 21.12 KG/M2 | SYSTOLIC BLOOD PRESSURE: 111 MMHG | DIASTOLIC BLOOD PRESSURE: 63 MMHG

## 2023-05-12 DIAGNOSIS — J34.2 NASAL SEPTAL DEVIATION: ICD-10-CM

## 2023-05-12 DIAGNOSIS — J34.3 NASAL TURBINATE HYPERTROPHY: ICD-10-CM

## 2023-05-12 DIAGNOSIS — J34.89 NASAL OBSTRUCTION: Primary | ICD-10-CM

## 2023-05-12 PROCEDURE — 99204 OFFICE O/P NEW MOD 45 MIN: CPT | Performed by: OTOLARYNGOLOGY

## 2023-05-12 NOTE — TELEPHONE ENCOUNTER
Reason for Call:  Other call back    Detailed comments: patient is scheduled for surgery on 07/17/2023 SEPTOPLASTY; BILATERAL INFERIOR TURBINATE REDUCTION (Bilateral / patient is noting that he is going to the [facility] and is concerned if this would be ok for him. Please call to advise Thank yo u    Phone Number Patient can be reached at: Home number on file [phone] (home)    Best Time: any    Can we leave a detailed message on this number? YES    Call taken on 5/12/2023 at 3:28 PM by Asha Mendieta

## 2023-05-12 NOTE — TELEPHONE ENCOUNTER
Type of surgery: SEPTOPLASTY; BILATERAL INFERIOR TURBINATE REDUCTION (Bilateral)   CPT 84836, 90222     Nasal obstruction J34.89     Nasal septal deviation J34.2     Nasal turbinate hypertrophy J34.3    Location of surgery: [facility]  Date and time of surgery: 07/17/2023  Surgeon: MARCELL  Pre-Op Appt Date: 07/10/2023  Post-Op Appt Date: 07/26/2023   Packet sent out: Yes  Pre-cert/Authorization completed:  No prior auth required per [facility] online site.    Date: 5-12-23    Mallorie Huddleston  Financial Securing/Prior Auth Dept  [phone]

## 2023-05-12 NOTE — LETTER
5/12/2023         RE: Zeferino Reyna  [address]  [place] [address]        Dear Colleague,    Thank you for referring your patient, Zeferino Reyna, to the [facility]. Please see a copy of my visit note below.    Chief Complaint - Nasal obstruction    History of Present Illness - Zeferino Reyna is a 37 year old male who presents for evaluation of nasal obstruction. The patient describes symptoms of nasal obstruction for the past many years. Also has difficulty sleeping. When he lies down he has postnasal drainage, worse nasal obstruction. The patient notes some allergies. Treatments have included breathe right strips, nasal irrigations, nasal steroids, and oral antihistamines. The treatments seem to not help much. Only afrin helps. + history of nasal trauma. No prior history of nasal surgery.    Past Medical History -   Patient Active Problem List   Diagnosis     ADD (attention deficit disorder)     Chondromalacia patellae     CARDIOVASCULAR SCREENING; LDL GOAL LESS THAN 160     Adjustment disorder with mixed anxiety and depressed mood     Moderate major depression (H)       Current Medications -   Current Outpatient Medications:      albuterol (PROAIR HFA/PROVENTIL HFA/VENTOLIN HFA) 108 (90 Base) MCG/ACT inhaler, Inhale 2 puffs into the lungs every 4 hours as needed for shortness of breath / dyspnea, wheezing or other (cough), Disp: 18 g, Rfl: 0     amphetamine-dextroamphetamine (ADDERALL XR) 20 MG 24 hr capsule, Take 1 capsule (20 mg) by mouth daily, Disp: 30 capsule, Rfl: 0     amphetamine-dextroamphetamine (ADDERALL) 10 MG tablet, Take 1 tablet (10 mg) by mouth daily as needed (inability to focus), Disp: 30 tablet, Rfl: 0     Multiple Vitamins-Minerals (MULTIVITAMIN OR), Take 1 tablet by mouth daily, Disp: , Rfl:      Omega-3 Fatty Acids (OMEGA-3 FISH OIL PO), Take 1 tablet by mouth daily, Disp: , Rfl:     Allergies -   Allergies   Allergen Reactions     Codeine  Sulfate Nausea and Vomiting     Codeine Nausea and Vomiting       Social History -   Social History     Socioeconomic History     Marital status: Single   Tobacco Use     Smoking status: Never     Smokeless tobacco: Never   Substance and Sexual Activity     Alcohol use: Yes     Comment: 8 drinks weekly     Drug use: No     Sexual activity: Not Currently     Partners: Female     Birth control/protection: Condom   Other Topics Concern     Caffeine Concern No     Occupational Exposure No     Hobby Hazards No     Sleep Concern No     Stress Concern Yes     Weight Concern No     Special Diet No     Back Care No     Exercise Yes     Comment: 2 X WEEK     Seat Belt Yes     Parent/sibling w/ CABG, MI or angioplasty before 65F 55M? No   Social History Narrative    Lives in [place].        Family History -   Family History   Problem Relation Age of Onset     No Known Problems Mother      Anxiety Disorder Father      Cerebrovascular Disease Maternal Grandmother      Breast Cancer Paternal Grandmother      GERD Brother 30     No Known Problems Maternal Grandfather      GERD Brother 30     No Known Problems Paternal Grandfather      Physical Exam  General - The patient is in no distress. Alert and oriented to person and place, answers questions and cooperates with examination appropriately.   Neurologic - CN II-XII are grossly intact. No focal neurologic deficits.   Voice and Breathing - The patient was breathing comfortably without the use of accessory muscles. There was no wheezing, stridor, or stertor.  The patients voice was clear and strong.  Eyes - Extraocular movements intact. Sclera were not icteric or injected, conjunctiva were pink and moist.  Mouth - Examination of the oral cavity showed pink, healthy oral mucosa. No lesions or ulcerations noted.  The tongue was mobile and midline, and the dentition were in good condition.    Throat - The walls of the oropharynx were smooth, pink, moist, symmetric, and  had no lesions or ulcerations.  The tonsillar pillars and soft palate were symmetric.  The uvula was midline on elevation.  Neck -  Soft, non-tender. Palpation of the occipital, submental, submandibular, internal jugular chain, and supraclavicular nodes did not demonstrate any abnormal lymph nodes or masses. No parotid masses. Palpation of the thyroid was soft and smooth, with no nodules or goiter appreciated.  The trachea was mobile and midline.  Nose - External contour is symmetric, no gross deflection or scars.  Nasal mucosa is pink and moist with clear mucus. However the turbinates are hypertrophic. The septum was deviated to the left and obstructive.  No polyps, masses, or purulence noted on examination.      A/P - Zeferino Reyna is a 37 year old male with nasal obstruction due to septal deviation and turbinate hypertrophy. He has tried and failed antihistamines, nasal steroids, saline irrigations, breathe right strips. Only afrin helps, but he gets rebound. Medical management failed, and I recommend nasal surgery in the form of septoplasty with inferior turbinate reduction and out-fracture.    I counseled the patient on the risks of surgery, including infection, bleeding, risks of general anesthesia, the risk of failure of the surgery to relieve nasal obstruction, the possible need for additional procedures, the possible need for additional medical therapy, and the possibility of alteration of the appearance of the external nose. They understood and wished to proceed to scheduling.      Stevenson Patel MD  Otolaryngology  [facility]        Again, thank you for allowing me to participate in the care of your patient.        Sincerely,        Stevenson Patel MD

## 2023-05-15 NOTE — TELEPHONE ENCOUNTER
Stevenson Patel MD Cameron, Michelle; Fz Specialty Triage 2 days ago     JT  Thanks Asha. Nurses can you please contact the patient and explain he should be fine and all healed up by the [facility]. He just can't get hit in the nose and take and severe pressure on the nose for 1 month after surgery. All normal physical activity can be resumed after 1 week except contact sports (1 month).     Thanks,     Vince      RN called and let patient know the above message. Patient verbalized understanding.    Claire CHAMBERS, Specialty RN 5/15/2023 10:15 AM

## 2023-07-02 ENCOUNTER — HEALTH MAINTENANCE LETTER (OUTPATIENT)
Age: 38
End: 2023-07-02

## 2023-07-10 ENCOUNTER — OFFICE VISIT (OUTPATIENT)
Dept: PEDIATRICS | Facility: CLINIC | Age: 38
End: 2023-07-10
Payer: COMMERCIAL

## 2023-07-10 VITALS
BODY MASS INDEX: 21.64 KG/M2 | WEIGHT: 146.1 LBS | SYSTOLIC BLOOD PRESSURE: 112 MMHG | OXYGEN SATURATION: 98 % | RESPIRATION RATE: 16 BRPM | HEIGHT: 69 IN | DIASTOLIC BLOOD PRESSURE: 70 MMHG | HEART RATE: 78 BPM | TEMPERATURE: 98 F

## 2023-07-10 DIAGNOSIS — J34.2 NASAL SEPTAL DEVIATION: ICD-10-CM

## 2023-07-10 DIAGNOSIS — Z01.818 PREOP GENERAL PHYSICAL EXAM: Primary | ICD-10-CM

## 2023-07-10 DIAGNOSIS — F98.8 ATTENTION DEFICIT DISORDER (ADD) WITHOUT HYPERACTIVITY: ICD-10-CM

## 2023-07-10 DIAGNOSIS — J34.3 NASAL TURBINATE HYPERTROPHY: ICD-10-CM

## 2023-07-10 PROCEDURE — 99214 OFFICE O/P EST MOD 30 MIN: CPT | Performed by: INTERNAL MEDICINE

## 2023-07-10 RX ORDER — DEXTROAMPHETAMINE SACCHARATE, AMPHETAMINE ASPARTATE MONOHYDRATE, DEXTROAMPHETAMINE SULFATE AND AMPHETAMINE SULFATE 5; 5; 5; 5 MG/1; MG/1; MG/1; MG/1
20 CAPSULE, EXTENDED RELEASE ORAL DAILY
Qty: 30 CAPSULE | Refills: 0 | Status: SHIPPED | OUTPATIENT
Start: 2023-07-10 | End: 2023-10-16

## 2023-07-10 RX ORDER — DEXTROAMPHETAMINE SACCHARATE, AMPHETAMINE ASPARTATE, DEXTROAMPHETAMINE SULFATE AND AMPHETAMINE SULFATE 2.5; 2.5; 2.5; 2.5 MG/1; MG/1; MG/1; MG/1
10 TABLET ORAL DAILY PRN
Qty: 30 TABLET | Refills: 0 | Status: SHIPPED | OUTPATIENT
Start: 2023-07-10 | End: 2023-10-16

## 2023-07-10 SDOH — ECONOMIC STABILITY: INCOME INSECURITY: HOW HARD IS IT FOR YOU TO PAY FOR THE VERY BASICS LIKE FOOD, HOUSING, MEDICAL CARE, AND HEATING?: NOT HARD AT ALL

## 2023-07-10 SDOH — ECONOMIC STABILITY: INCOME INSECURITY: IN THE LAST 12 MONTHS, WAS THERE A TIME WHEN YOU WERE NOT ABLE TO PAY THE MORTGAGE OR RENT ON TIME?: NO

## 2023-07-10 SDOH — ECONOMIC STABILITY: FOOD INSECURITY: WITHIN THE PAST 12 MONTHS, YOU WORRIED THAT YOUR FOOD WOULD RUN OUT BEFORE YOU GOT MONEY TO BUY MORE.: NEVER TRUE

## 2023-07-10 SDOH — HEALTH STABILITY: PHYSICAL HEALTH: ON AVERAGE, HOW MANY DAYS PER WEEK DO YOU ENGAGE IN MODERATE TO STRENUOUS EXERCISE (LIKE A BRISK WALK)?: 6 DAYS

## 2023-07-10 SDOH — ECONOMIC STABILITY: TRANSPORTATION INSECURITY
IN THE PAST 12 MONTHS, HAS THE LACK OF TRANSPORTATION KEPT YOU FROM MEDICAL APPOINTMENTS OR FROM GETTING MEDICATIONS?: NO

## 2023-07-10 SDOH — ECONOMIC STABILITY: FOOD INSECURITY: WITHIN THE PAST 12 MONTHS, THE FOOD YOU BOUGHT JUST DIDN'T LAST AND YOU DIDN'T HAVE MONEY TO GET MORE.: NEVER TRUE

## 2023-07-10 SDOH — HEALTH STABILITY: PHYSICAL HEALTH: ON AVERAGE, HOW MANY MINUTES DO YOU ENGAGE IN EXERCISE AT THIS LEVEL?: 30 MIN

## 2023-07-10 SDOH — ECONOMIC STABILITY: TRANSPORTATION INSECURITY
IN THE PAST 12 MONTHS, HAS LACK OF TRANSPORTATION KEPT YOU FROM MEETINGS, WORK, OR FROM GETTING THINGS NEEDED FOR DAILY LIVING?: NO

## 2023-07-10 ASSESSMENT — ANXIETY QUESTIONNAIRES
6. BECOMING EASILY ANNOYED OR IRRITABLE: NOT AT ALL
2. NOT BEING ABLE TO STOP OR CONTROL WORRYING: NOT AT ALL
GAD7 TOTAL SCORE: 3
7. FEELING AFRAID AS IF SOMETHING AWFUL MIGHT HAPPEN: NOT AT ALL
5. BEING SO RESTLESS THAT IT IS HARD TO SIT STILL: NOT AT ALL
4. TROUBLE RELAXING: SEVERAL DAYS
IF YOU CHECKED OFF ANY PROBLEMS ON THIS QUESTIONNAIRE, HOW DIFFICULT HAVE THESE PROBLEMS MADE IT FOR YOU TO DO YOUR WORK, TAKE CARE OF THINGS AT HOME, OR GET ALONG WITH OTHER PEOPLE: SOMEWHAT DIFFICULT
GAD7 TOTAL SCORE: 3
3. WORRYING TOO MUCH ABOUT DIFFERENT THINGS: SEVERAL DAYS
1. FEELING NERVOUS, ANXIOUS, OR ON EDGE: SEVERAL DAYS

## 2023-07-10 ASSESSMENT — LIFESTYLE VARIABLES
AUDIT-C TOTAL SCORE: 6
HOW MANY STANDARD DRINKS CONTAINING ALCOHOL DO YOU HAVE ON A TYPICAL DAY: 3 OR 4
HOW OFTEN DO YOU HAVE A DRINK CONTAINING ALCOHOL: 2-3 TIMES A WEEK
HOW OFTEN DO YOU HAVE SIX OR MORE DRINKS ON ONE OCCASION: MONTHLY
SKIP TO QUESTIONS 9-10: 0

## 2023-07-10 ASSESSMENT — PATIENT HEALTH QUESTIONNAIRE - PHQ9
SUM OF ALL RESPONSES TO PHQ QUESTIONS 1-9: 6
10. IF YOU CHECKED OFF ANY PROBLEMS, HOW DIFFICULT HAVE THESE PROBLEMS MADE IT FOR YOU TO DO YOUR WORK, TAKE CARE OF THINGS AT HOME, OR GET ALONG WITH OTHER PEOPLE: SOMEWHAT DIFFICULT
SUM OF ALL RESPONSES TO PHQ QUESTIONS 1-9: 6

## 2023-07-10 ASSESSMENT — SOCIAL DETERMINANTS OF HEALTH (SDOH)
HOW OFTEN DO YOU ATTEND CHURCH OR RELIGIOUS SERVICES?: NEVER
HOW OFTEN DO YOU GET TOGETHER WITH FRIENDS OR RELATIVES?: MORE THAN THREE TIMES A WEEK
ARE YOU MARRIED, WIDOWED, DIVORCED, SEPARATED, NEVER MARRIED, OR LIVING WITH A PARTNER?: NEVER MARRIED
DO YOU BELONG TO ANY CLUBS OR ORGANIZATIONS SUCH AS CHURCH GROUPS UNIONS, FRATERNAL OR ATHLETIC GROUPS, OR SCHOOL GROUPS?: YES
IN A TYPICAL WEEK, HOW MANY TIMES DO YOU TALK ON THE PHONE WITH FAMILY, FRIENDS, OR NEIGHBORS?: MORE THAN THREE TIMES A WEEK

## 2023-07-10 ASSESSMENT — PAIN SCALES - GENERAL: PAINLEVEL: NO PAIN (0)

## 2023-07-10 NOTE — PROGRESS NOTES
[facility]  [address] [place] [address]  [address]  [place] [address]  Phone: [phone]  Fax: [phone]  Primary Provider: Ethan Adams    PREOPERATIVE EVALUATION:  Today's date: 7/10/2023    Zeferino Reyna is a 37 year old male who presents for a preoperative evaluation.      7/10/2023    10:47 AM   Additional Questions   Roomed by Jen Griffith     Surgical Information:  Surgery/Procedure: Septoplasty, bilateral turbinate reduction   Sur[facility]   Surgeon: Jorge   Surgery Date: 7/17/23  Time of Surgery: Undetermined   Where patient plans to recover: At home with family    Assessment & Plan     The proposed surgical procedure is considered LOW risk.      ICD-10-CM    1. Preop general physical exam  Z01.818       2. Nasal turbinate hypertrophy  J34.3       3. Nasal septal deviation  J34.2       4. Attention deficit disorder (ADD) without hyperactivity  F98.8 amphetamine-dextroamphetamine (ADDERALL XR) 20 MG 24 hr capsule     amphetamine-dextroamphetamine (ADDERALL) 10 MG tablet           RECOMMENDATION:  APPROVAL GIVEN to proceed with proposed procedure, without further diagnostic evaluation.    Ethan Adams MD      Subjective       HPI related to upcoming procedure: Deviated septum, enlarged turbinates. Causing difficulty w/ sleep. Plan for surgery as noted above.         7/10/2023    10:44 AM   Preop Questions   1. Have you ever had a heart attack or stroke? No   2. Have you ever had surgery on your heart or blood vessels, such as a stent placement, a coronary artery bypass, or surgery on an artery in your head, neck, heart, or legs? No   3. Do you have chest pain with activity? No   4. Do you have a history of  heart failure? No   5. Do you currently have a cold, bronchitis or symptoms of other infection? No   6. Do you have a cough, shortness of breath, or wheezing? No   7. Do you or anyone in your family have previous history of blood clots? No   8. Do you or  does anyone in your family have a serious bleeding problem such as prolonged bleeding following surgeries or cuts? No   9. Have you ever had problems with anemia or been told to take iron pills? No   10. Have you had any abnormal blood loss such as black, tarry or bloody stools? No   11. Have you ever had a blood transfusion? No   12. Are you willing to have a blood transfusion if it is medically needed before, during, or after your surgery? Yes   13. Have you or any of your relatives ever had problems with anesthesia? No   14. Do you have sleep apnea, excessive snoring or daytime drowsiness? No    15. Do you have any artifical heart valves or other implanted medical devices like a pacemaker, defibrillator, or continuous glucose monitor? No   16. Do you have artificial joints? No   17. Are you allergic to latex? No     ADD. Treated with Adderall. Discussed holding his dose the day of surgery.     Patient Active Problem List   Diagnosis     ADD (attention deficit disorder)     Chondromalacia patellae     CARDIOVASCULAR SCREENING; LDL GOAL LESS THAN 160     Adjustment disorder with mixed anxiety and depressed mood     Moderate major depression (H)     Nasal obstruction     Nasal septal deviation     Nasal turbinate hypertrophy        Past Medical History:   Diagnosis Date     ADD (attention deficit disorder)      Depression inactive      Eczema      Past Surgical History:   Procedure Laterality Date     HC TOOTH EXTRACTION W/FORCEP       Current Outpatient Medications   Medication Sig Dispense Refill     amphetamine-dextroamphetamine (ADDERALL XR) 20 MG 24 hr capsule Take 1 capsule (20 mg) by mouth daily 30 capsule 0     amphetamine-dextroamphetamine (ADDERALL) 10 MG tablet Take 1 tablet (10 mg) by mouth daily as needed (inability to focus) 30 tablet 0     Multiple Vitamins-Minerals (MULTIVITAMIN OR) Take 1 tablet by mouth daily         Allergies   Allergen Reactions     Codeine Sulfate Nausea and Vomiting     Codeine  "Nausea and Vomiting        Social History     Tobacco Use     Smoking status: Never     Smokeless tobacco: Never   Substance Use Topics     Alcohol use: Yes     Comment: 8 drinks weekly     History   Drug Use No         Objective     /70   Pulse 78   Temp 98  F (36.7  C) (Tympanic)   Resp 16   Ht 1.753 m (5' 9\")   Wt 66.3 kg (146 lb 1.6 oz)   SpO2 98%   BMI 21.58 kg/m      Physical Exam  GENERAL: healthy, alert and no distress  EYES: PERRL, EOMI  HENT: ear canals and TM's normal  NECK: no adenopathy  RESP: lungs clear to auscultation - no rales, rhonchi or wheezes  CV: regular rate and rhythm, normal S1 S2, no murmur, no peripheral edema and peripheral pulses strong  ABDOMEN: soft, nontender, bowel sounds normal  MS: no gross musculoskeletal defects noted  SKIN: no suspicious lesions or rashes  NEURO: Normal strength and tone  PSYCH: mentation appears normal, affect normal/bright       Diagnostics:  No labs were ordered during this visit.            Signed Electronically by: Ethan Adams MD    "

## 2023-07-14 ENCOUNTER — ANESTHESIA EVENT (OUTPATIENT)
Dept: SURGERY | Facility: AMBULATORY SURGERY CENTER | Age: 38
End: 2023-07-14
Payer: COMMERCIAL

## 2023-07-14 NOTE — ANESTHESIA PREPROCEDURE EVALUATION
Anesthesia Pre-Procedure Evaluation    Patient: Zeferino Reyna   MRN: [id number] : 1985        Procedure : Procedure(s):  SEPTOPLASTY; BILATERAL INFERIOR TURBINATE REDUCTION          Past Medical History:   Diagnosis Date     ADD (attention deficit disorder)      Depression inactive      Eczema       Past Surgical History:   Procedure Laterality Date     HC TOOTH EXTRACTION W/FORCEP        Allergies   Allergen Reactions     Codeine Sulfate Nausea and Vomiting     Codeine Nausea and Vomiting      Social History     Tobacco Use     Smoking status: Never     Smokeless tobacco: Never   Substance Use Topics     Alcohol use: Yes     Comment: 8 drinks weekly      Wt Readings from Last 1 Encounters:   07/10/23 66.3 kg (146 lb 1.6 oz)        Anesthesia Evaluation   Pt has had prior anesthetic. Type of anesthetic: wisdom teeth.        ROS/MED HX  ENT/Pulmonary: Comment: Nasal obstruction - neg pulmonary ROS     Neurologic:  - neg neurologic ROS     Cardiovascular:  - neg cardiovascular ROS  (-) murmur   METS/Exercise Tolerance: >4 METS    Hematologic:  - neg hematologic  ROS     Musculoskeletal:  - neg musculoskeletal ROS     GI/Hepatic:  - neg GI/hepatic ROS     Renal/Genitourinary:  - neg Renal ROS     Endo:  - neg endo ROS     Psychiatric/Substance Use:     (+) psychiatric history depression     Infectious Disease:  - neg infectious disease ROS     Malignancy:  - neg malignancy ROS     Other:  - neg other ROS          Physical Exam    Airway        Mallampati: I   TM distance: > 3 FB   Neck ROM: full   Mouth opening: > 3 cm    Respiratory Devices and Support         Dental     Comment: Teeth examined without any significant abnormality, patient denies loose, missing or chipped teeth or anything removable.         Cardiovascular          Rhythm and rate: regular and normal (-) no murmur    Pulmonary   pulmonary exam normal        breath sounds clear to auscultation           OUTSIDE LABS:  CBC:   Lab Results    Component Value Date    WBC 8.2 06/09/2010    HGB 17.0 06/09/2010    HCT 47.2 06/09/2010     06/09/2010     BMP:   Lab Results   Component Value Date     06/09/2010    POTASSIUM 4.2 06/09/2010    CHLORIDE 97 06/09/2010    CO2 26 06/09/2010    BUN 14 06/09/2010    CR 0.93 06/09/2010    GLC 92 06/09/2010     COAGS: No results found for: PTT, INR, FIBR  POC: No results found for: BGM, HCG, HCGS  HEPATIC:   Lab Results   Component Value Date    ALBUMIN 5.0 06/09/2010    PROTTOTAL 8.3 06/09/2010    ALT 22 06/09/2010    AST 28 06/09/2010    ALKPHOS 64 06/09/2010    BILITOTAL 0.7 06/09/2010     OTHER:   Lab Results   Component Value Date    HIEN 10.0 06/09/2010    TSH 1.04 06/09/2010       Anesthesia Plan    ASA Status:  2   NPO Status:  NPO Appropriate    Anesthesia Type: General.     - Airway: ETT   Induction: Intravenous, Propofol.   Maintenance: Balanced.        Consents    Anesthesia Plan(s) and associated risks, benefits, and realistic alternatives discussed. Questions answered and patient/representative(s) expressed understanding.    - Discussed:     - Discussed with:  Patient      - Extended Intubation/Ventilatory Support Discussed: No.      - Patient is DNR/DNI Status: No    Use of blood products discussed: No .     Postoperative Care    Pain management: IV analgesics, Oral pain medications, Multi-modal analgesia.   PONV prophylaxis: Ondansetron (or other 5HT-3), Dexamethasone or Solumedrol, Background Propofol Infusion     Comments:    Other Comments: Discussed plan for general anesthetic with Oral ETT. Discussed risks of sore throat, post op pain/nausea, oropharyngeal damage, rare major complications.         H&P reviewed: Unable to attach H&P to encounter due to EHR limitations. H&P Update: appropriate H&P reviewed, patient examined. No interval changes since H&P (within 30 days).         Joaquín Banks MD

## 2023-07-17 ENCOUNTER — ANESTHESIA (OUTPATIENT)
Dept: SURGERY | Facility: AMBULATORY SURGERY CENTER | Age: 38
End: 2023-07-17
Payer: COMMERCIAL

## 2023-07-17 ENCOUNTER — HOSPITAL ENCOUNTER (OUTPATIENT)
Facility: AMBULATORY SURGERY CENTER | Age: 38
Discharge: HOME | End: 2023-07-17
Attending: OTOLARYNGOLOGY | Admitting: OTOLARYNGOLOGY
Payer: COMMERCIAL

## 2023-07-17 VITALS
BODY MASS INDEX: 21.56 KG/M2 | HEART RATE: 76 BPM | RESPIRATION RATE: 16 BRPM | DIASTOLIC BLOOD PRESSURE: 82 MMHG | OXYGEN SATURATION: 98 % | TEMPERATURE: 97.8 F | SYSTOLIC BLOOD PRESSURE: 115 MMHG | WEIGHT: 146 LBS

## 2023-07-17 DIAGNOSIS — J34.3 NASAL TURBINATE HYPERTROPHY: ICD-10-CM

## 2023-07-17 DIAGNOSIS — J34.89 NASAL OBSTRUCTION: ICD-10-CM

## 2023-07-17 DIAGNOSIS — J34.2 NASAL SEPTAL DEVIATION: ICD-10-CM

## 2023-07-17 PROCEDURE — 30520 REPAIR OF NASAL SEPTUM: CPT | Performed by: OTOLARYNGOLOGY

## 2023-07-17 PROCEDURE — 30520 REPAIR OF NASAL SEPTUM: CPT

## 2023-07-17 PROCEDURE — 30140 RESECT INFERIOR TURBINATE: CPT | Mod: LT

## 2023-07-17 PROCEDURE — G8916 PT W IV AB GIVEN ON TIME: HCPCS

## 2023-07-17 PROCEDURE — G8907 PT DOC NO EVENTS ON DISCHARG: HCPCS

## 2023-07-17 PROCEDURE — 30140 RESECT INFERIOR TURBINATE: CPT | Mod: 50 | Performed by: OTOLARYNGOLOGY

## 2023-07-17 RX ORDER — METOPROLOL TARTRATE 1 MG/ML
1-2 INJECTION, SOLUTION INTRAVENOUS EVERY 5 MIN PRN
Status: DISCONTINUED | OUTPATIENT
Start: 2023-07-17 | End: 2023-07-18 | Stop reason: HOSPADM

## 2023-07-17 RX ORDER — COCAINE HYDROCHLORIDE 40 MG/ML
SOLUTION NASAL PRN
Status: DISCONTINUED | OUTPATIENT
Start: 2023-07-17 | End: 2023-07-17 | Stop reason: HOSPADM

## 2023-07-17 RX ORDER — OXYCODONE HYDROCHLORIDE 5 MG/1
5 TABLET ORAL EVERY 4 HOURS PRN
Status: DISCONTINUED | OUTPATIENT
Start: 2023-07-17 | End: 2023-07-18 | Stop reason: HOSPADM

## 2023-07-17 RX ORDER — FENTANYL CITRATE 50 UG/ML
25 INJECTION, SOLUTION INTRAMUSCULAR; INTRAVENOUS
Status: DISCONTINUED | OUTPATIENT
Start: 2023-07-17 | End: 2023-07-18 | Stop reason: HOSPADM

## 2023-07-17 RX ORDER — SODIUM CHLORIDE, SODIUM LACTATE, POTASSIUM CHLORIDE, CALCIUM CHLORIDE 600; 310; 30; 20 MG/100ML; MG/100ML; MG/100ML; MG/100ML
INJECTION, SOLUTION INTRAVENOUS CONTINUOUS
Status: DISCONTINUED | OUTPATIENT
Start: 2023-07-17 | End: 2023-07-18 | Stop reason: HOSPADM

## 2023-07-17 RX ORDER — PROPOFOL 10 MG/ML
INJECTION, EMULSION INTRAVENOUS PRN
Status: DISCONTINUED | OUTPATIENT
Start: 2023-07-17 | End: 2023-07-17

## 2023-07-17 RX ORDER — DEXAMETHASONE SODIUM PHOSPHATE 4 MG/ML
INJECTION, SOLUTION INTRA-ARTICULAR; INTRALESIONAL; INTRAMUSCULAR; INTRAVENOUS; SOFT TISSUE PRN
Status: DISCONTINUED | OUTPATIENT
Start: 2023-07-17 | End: 2023-07-17

## 2023-07-17 RX ORDER — LIDOCAINE 40 MG/G
CREAM TOPICAL
Status: DISCONTINUED | OUTPATIENT
Start: 2023-07-17 | End: 2023-07-18 | Stop reason: HOSPADM

## 2023-07-17 RX ORDER — HYDROCODONE BITARTRATE AND ACETAMINOPHEN 5; 325 MG/1; MG/1
1-2 TABLET ORAL EVERY 6 HOURS PRN
Qty: 12 TABLET | Refills: 0 | Status: SHIPPED | OUTPATIENT
Start: 2023-07-17 | End: 2023-10-16

## 2023-07-17 RX ORDER — ACETAMINOPHEN 325 MG/1
975 TABLET ORAL ONCE
Status: COMPLETED | OUTPATIENT
Start: 2023-07-17 | End: 2023-07-17

## 2023-07-17 RX ORDER — LIDOCAINE HYDROCHLORIDE 20 MG/ML
INJECTION, SOLUTION INFILTRATION; PERINEURAL PRN
Status: DISCONTINUED | OUTPATIENT
Start: 2023-07-17 | End: 2023-07-17

## 2023-07-17 RX ORDER — ONDANSETRON 4 MG/1
4 TABLET, ORALLY DISINTEGRATING ORAL EVERY 30 MIN PRN
Status: DISCONTINUED | OUTPATIENT
Start: 2023-07-17 | End: 2023-07-18 | Stop reason: HOSPADM

## 2023-07-17 RX ORDER — BACITRACIN ZINC 500 [USP'U]/G
OINTMENT TOPICAL PRN
Status: DISCONTINUED | OUTPATIENT
Start: 2023-07-17 | End: 2023-07-17 | Stop reason: HOSPADM

## 2023-07-17 RX ORDER — PROPOFOL 10 MG/ML
INJECTION, EMULSION INTRAVENOUS CONTINUOUS PRN
Status: DISCONTINUED | OUTPATIENT
Start: 2023-07-17 | End: 2023-07-17

## 2023-07-17 RX ORDER — HYDRALAZINE HYDROCHLORIDE 20 MG/ML
2.5-5 INJECTION INTRAMUSCULAR; INTRAVENOUS EVERY 10 MIN PRN
Status: DISCONTINUED | OUTPATIENT
Start: 2023-07-17 | End: 2023-07-18 | Stop reason: HOSPADM

## 2023-07-17 RX ORDER — CEFAZOLIN SODIUM 2 G/100ML
2 INJECTION, SOLUTION INTRAVENOUS
Status: COMPLETED | OUTPATIENT
Start: 2023-07-17 | End: 2023-07-17

## 2023-07-17 RX ORDER — ONDANSETRON 2 MG/ML
INJECTION INTRAMUSCULAR; INTRAVENOUS PRN
Status: DISCONTINUED | OUTPATIENT
Start: 2023-07-17 | End: 2023-07-17

## 2023-07-17 RX ORDER — ONDANSETRON 2 MG/ML
4 INJECTION INTRAMUSCULAR; INTRAVENOUS EVERY 30 MIN PRN
Status: DISCONTINUED | OUTPATIENT
Start: 2023-07-17 | End: 2023-07-18 | Stop reason: HOSPADM

## 2023-07-17 RX ORDER — FENTANYL CITRATE 50 UG/ML
25 INJECTION, SOLUTION INTRAMUSCULAR; INTRAVENOUS EVERY 5 MIN PRN
Status: DISCONTINUED | OUTPATIENT
Start: 2023-07-17 | End: 2023-07-18 | Stop reason: HOSPADM

## 2023-07-17 RX ORDER — FENTANYL CITRATE 50 UG/ML
50 INJECTION, SOLUTION INTRAMUSCULAR; INTRAVENOUS EVERY 5 MIN PRN
Status: DISCONTINUED | OUTPATIENT
Start: 2023-07-17 | End: 2023-07-18 | Stop reason: HOSPADM

## 2023-07-17 RX ORDER — CEFAZOLIN SODIUM 2 G/100ML
2 INJECTION, SOLUTION INTRAVENOUS SEE ADMIN INSTRUCTIONS
Status: DISCONTINUED | OUTPATIENT
Start: 2023-07-17 | End: 2023-07-18 | Stop reason: HOSPADM

## 2023-07-17 RX ORDER — OXYCODONE HYDROCHLORIDE 5 MG/1
10 TABLET ORAL EVERY 4 HOURS PRN
Status: DISCONTINUED | OUTPATIENT
Start: 2023-07-17 | End: 2023-07-18 | Stop reason: HOSPADM

## 2023-07-17 RX ORDER — CEPHALEXIN 500 MG/1
500 CAPSULE ORAL 3 TIMES DAILY
Qty: 27 CAPSULE | Refills: 0 | Status: SHIPPED | OUTPATIENT
Start: 2023-07-17 | End: 2023-07-26

## 2023-07-17 RX ORDER — LIDOCAINE HYDROCHLORIDE AND EPINEPHRINE 10; 10 MG/ML; UG/ML
INJECTION, SOLUTION INFILTRATION; PERINEURAL PRN
Status: DISCONTINUED | OUTPATIENT
Start: 2023-07-17 | End: 2023-07-17 | Stop reason: HOSPADM

## 2023-07-17 RX ADMIN — PROPOFOL 150 MCG/KG/MIN: 10 INJECTION, EMULSION INTRAVENOUS at 12:46

## 2023-07-17 RX ADMIN — PROPOFOL 200 MG: 10 INJECTION, EMULSION INTRAVENOUS at 12:46

## 2023-07-17 RX ADMIN — Medication 0.5 MG: at 12:46

## 2023-07-17 RX ADMIN — DEXAMETHASONE SODIUM PHOSPHATE 8 MG: 4 INJECTION, SOLUTION INTRA-ARTICULAR; INTRALESIONAL; INTRAMUSCULAR; INTRAVENOUS; SOFT TISSUE at 13:08

## 2023-07-17 RX ADMIN — FENTANYL CITRATE 25 MCG: 50 INJECTION, SOLUTION INTRAMUSCULAR; INTRAVENOUS at 14:03

## 2023-07-17 RX ADMIN — ONDANSETRON 4 MG: 2 INJECTION INTRAMUSCULAR; INTRAVENOUS at 13:37

## 2023-07-17 RX ADMIN — LIDOCAINE HYDROCHLORIDE 40 MG: 20 INJECTION, SOLUTION INFILTRATION; PERINEURAL at 12:46

## 2023-07-17 RX ADMIN — Medication 40 MG: at 12:46

## 2023-07-17 RX ADMIN — ACETAMINOPHEN 975 MG: 325 TABLET ORAL at 11:42

## 2023-07-17 RX ADMIN — OXYCODONE HYDROCHLORIDE 5 MG: 5 TABLET ORAL at 14:04

## 2023-07-17 RX ADMIN — Medication 0.2 MG: at 13:10

## 2023-07-17 RX ADMIN — SODIUM CHLORIDE, SODIUM LACTATE, POTASSIUM CHLORIDE, CALCIUM CHLORIDE: 600; 310; 30; 20 INJECTION, SOLUTION INTRAVENOUS at 11:43

## 2023-07-17 RX ADMIN — CEFAZOLIN SODIUM 2 G: 2 INJECTION, SOLUTION INTRAVENOUS at 12:40

## 2023-07-17 NOTE — ANESTHESIA PROCEDURE NOTES
Airway       Patient location during procedure: OR       Procedure Start/Stop Times: 7/17/2023 12:48 PM  Staff -        CRNA: Jo-Ann Mcneill APRN CRNA       Performed By: CRNA  Consent for Airway        Urgency: elective  Indications and Patient Condition       Indications for airway management: tasha-procedural       Induction type:intravenous       Mask difficulty assessment: 1 - vent by mask    Final Airway Details       Final airway type: endotracheal airway       Successful airway: ETT - single and Oral  Endotracheal Airway Details        ETT size (mm): 7.5       Cuffed: yes       Successful intubation technique: direct laryngoscopy       DL Blade Type: MAC 4       Grade View of Cords: 1       Adjucts: stylet       Position: Center       Measured from: gums/teeth       Secured at (cm): 25       Bite block used: None    Post intubation assessment        Placement verified by: capnometry, equal breath sounds and chest rise        Number of attempts at approach: 1       Secured with: silk tape       Ease of procedure: easy       Dentition: Intact and Unchanged    Medication(s) Administered   Medication Administration Time: 7/17/2023 12:48 PM

## 2023-07-17 NOTE — OP NOTE
PREOPERATIVE DIAGNOSIS  1. Nasal obstruction.   2. Septal deviation.   3. Bilateral turbinate hypertrophy    POSTOPERATIVE DIAGNOSIS  1. Nasal obstruction.   2. Septal deviation.   3. Bilateral turbinate hypertrophy    PROCEDURES PERFORMED:   1. Septoplasty  2. Bilateral submucous resection of inferior turbinates.   3. Bilateral inferior turbinate out-fracture    SURGEON: Stevenson Patel MD   ASSISTANTS: none  BLOOD LOSS: 25 mL.   COMPLICATIONS: None.   SPECIMENS: None.   ANESTHESIA: GETA.   DRAINS, IMPLANTS, and DEVICES: bilateral Land splints    INDICATIONS: Zeferino Reyna presented to me with a history of chronic nasal obstruction. On evaluation, the patient had a deviated septum to the left and bilateral inferior turbinate hypertrophy. Therefore, my recommendation was for the above-named procedures. Preoperatively, risks discussed included the risks of infection, bleeding, the risks of general anesthesia, possible injury to the eyes, base of skull and tear duct system, and possible failure of the surgery to achieve the desired result, septal perforation, and need for revision. The patient understood these risks and possible outcomes and wished to proceed.   OPERATIVE PROCEDURE: After being taken to the operating room and induction of general endotracheal tube anesthesia, the bed was rotated 90 degrees. A procedural pause was performed to identify the patient by name, birthday, and procedure. After that, I began by applying topical anesthetic in the form of 2 cottonoids on each side of the nose which had been soaked with a total of 4 mL of 4% liquid cocaine. In addition, I injected 1% lidocaine with 1:100,000 epinephrine into the right hemitransfixion incision area, bilateral anterior septum, and overlying any spurs. They were prepped and draped in the normal clean fashion.  I removed the cottonoids from the nose and entered the right nasal cavity.   I made a right hemitransfixion septoplasty incision in the right  "nasal vestibule in a traditional open fashion. I then dissected down onto the left side of the cartilaginous septum through the right-sided incision. I then was able to start a submucoperichondrial pocket directly on the left side of the cartilaginous septum. The patient had a deviated right maxillary crest as well as the cartilage deflected to the left off the maxillary crest. After I completely elevated the mucoperichondrium off the left side of the septum and the bony spur, I continued posterior raising a flap off the bone. I then made a chondrotomy incision through the cartilage in a \"C\" shape fashion approximately 1.5 cm back from the anterior edge and 1.5 cm from the dorsum. I broke over to the right side and raised a submucoperiosteal flap on the entire right side of the nasal septum. After this was done, I freed the cartilage from the bony septum, and I removed it in one large piece. It was brought to the back table and carved free of the defelection for later reinserted back into the mucoperichondrial pocket. I then used a Cherri to remove the deflected maxillary crest. I also used a double-action scissors and removed a portion of the posterior bony septum by cutting superiorly leaving an adequate strut. Some of the bony septum was removed and discarded including the left bony spur. I placed the cartilage back in to the flaps. I laid the flaps back together and this significantly improved the nasal airway. I irrigated the septum in between the flaps. I then closed my septoplasty incision with 3 simple interrupted 4-0 chromic gut sutures.   I proceeded to the final components of the operation, which was submucous resection of inferior turbinates with out-fracture.  I injected both inferior turbinates along their entire length with 1% lidocaine, 1:100,000 epinephrine using a spinal needle. I used the 15 blade to make a stab incision at the head of the right inferior turbinate. I then used a caudal to free " the mucoperiosteum from the inferior turbinate bone along the medial aspect of the bone all the way posteriorly. Using a 2.0 mm medtronic shaver blade, I slowly entered the pocket and ran the shaver function to perform my submucous resection of the right inferior turbinate. I used the shaver along the entire length of the inferior turbinate from anterior to posterior.   I then performed the same procedure on the left side. Once again using the 15 blade, I made a stab incision at the head of the left inferior turbinate.  I then used a caudal to free the mucoperiosteum from the inferior turbinate bone along the medial aspect of the bone all the way posteriorly. Using a 2.0 mm medtronic shaver blade, I slowly entered the pocket and ran the shaver function to perform my submucous resection of the left inferior turbinate. I used the shaver along the entire length of the inferior turbinate from anterior to posterior. I closed each stab incision with a simple interrupted 5-0 chromic suture.   Lastly I performed out-fracture by using the back end of the caudal to fracture both inferior turbinate bones laterally. I did this at multiple spots along each bone. I then used the longest nasal speculum to fracture the entire inferior turbinates laterally on both sides. The airway was now wide open on both sides.   Lastly, I placed a Land stent on each side and stitched them together with a 3-0 nylon suture.  At this point, the entire procedure was now complete. I reinspected both sides of the nose and there was good hemostasis with a greatly improved airway bilaterally.  All instruments were accounted for and all counts were correct. The patient's bed was rotated 90 degrees back to the care of anesthesia. They were awakened, extubated and sent to the recovery room in good condition.

## 2023-07-17 NOTE — ANESTHESIA CARE TRANSFER NOTE
Patient: Zeferino Reyna    Procedure: Procedure(s):  SEPTOPLASTY; BILATERAL INFERIOR TURBINATE REDUCTION       Diagnosis: Nasal obstruction [J34.89]  Nasal septal deviation [J34.2]  Nasal turbinate hypertrophy [J34.3]  Diagnosis Additional Information: No value filed.    Anesthesia Type:   General     Note:    Oropharynx: oropharynx clear of all foreign objects and spontaneously breathing  Level of Consciousness: awake and drowsy  Oxygen Supplementation: face mask  Level of Supplemental Oxygen (L/min / FiO2): 6  Independent Airway: airway patency satisfactory and stable  Dentition: dentition unchanged  Vital Signs Stable: post-procedure vital signs reviewed and stable  Report to RN Given: handoff report given  Patient transferred to: PACU    Handoff Report: Identifed the Patient, Identified the Reponsible Provider, Reviewed the pertinent medical history, Discussed the surgical course, Reviewed Intra-OP anesthesia mangement and issues during anesthesia, Set expectations for post-procedure period and Allowed opportunity for questions and acknowledgement of understanding      Vitals:  Vitals Value Taken Time   /76 07/17/23 1359   Temp 97.9    Pulse 82 07/17/23 1401   Resp 5 07/17/23 1401   SpO2 99 % 07/17/23 1401   Vitals shown include unvalidated device data.    Electronically Signed By: BAILEY Chu CRNA  July 17, 2023  2:02 PM

## 2023-07-17 NOTE — DISCHARGE INSTRUCTIONS
[facility]   Adult Discharge Orders & Instructions     For 24 hours after surgery    Get plenty of rest.  A responsible adult must stay with you for at least 24 hours after you leave the hospital.   Do not drive or use heavy equipment.  If you have weakness or tingling, don't drive or use heavy equipment until this feeling goes away.  Do not drink alcohol.  Avoid strenuous or risky activities.  Ask for help when climbing stairs.   You may feel lightheaded.  IF so, sit for a few minutes before standing.  Have someone help you get up.   If you have nausea (feel sick to your stomach): Drink only clear liquids such as apple juice, ginger ale, broth or 7-Up.  Rest may also help.  Be sure to drink enough fluids.  Move to a regular diet as you feel able.  You may have a slight fever. Call the doctor if your fever is over 100 F (37.7 C) (taken under the tongue) or lasts longer than 24 hours.  You may have a dry mouth, a sore throat, muscle aches or trouble sleeping.  These should go away after 24 hours.  Do not make important or legal decisions.     Call your doctor for any of the followin.  Signs of infection (fever, growing tenderness at the surgery site, a large amount of drainage or bleeding, severe pain, foul-smelling drainage, redness, swelling).    2. It has been over 8 to 10 hours since surgery and you are still not able to urinate (pass water).    3.  Headache for over 24 hours.    4.  Numbness, tingling or weakness the day after surgery (if you had spinal anesthesia).                  5. Signs of Covid-19 infection (temperature over 100 degrees, shortness of breath, cough, loss of taste/smell, generalized body aches, persistent headache,                  chills, sore throat, nausea/vomiting/diarrhea).     ________________________________________      Instructions following Septoplasty    Recovery - Everyone recovers differently from a general anesthetic.  Symptoms such as fatigue, nausea,  lightheadedness, and sometimes a low grade fever (up to 101 degrees) are not unusual.  As your body removes the anesthetic drugs from circulation, these symptoms will resolve.  Your nose will be sore after surgery, and you may even have symptoms similar to a sinus infection with headache, congestion, and pressure.  These will resolve with healing.  For several days you may experience bloody drainage from the nose, please use the drip pad as necessary for this. Call the office if the bleeding persists after 3 days or is perfuse. There are no diet restrictions after septoplasty, and you can resume your home medications except for blood thinners.  Please do not blow your nose for two weeks after surgery. You may gently dab your nose with Kleenex. Limit your activity to no strenuous activities until I see you for the first follow up visit, and sleep with your head elevated.    Medications - You were sent home with narcotic pain medication.  If you can tolerate the discomfort during your recovery by using just plain Tylenol or ibuprofen (advil), please do so.  However, do not hesitate to use the stronger pain medication if needed.  If you were sent home with an antibiotic, it is primarily used to help the healing process.  If it causes loose bowel movements or other signs of intolerance, it is appropriate to discontinue it.      Complications - Problems related to septoplasty almost always are detected during the operation, and special instruction will be given in that situation.  However, unexpected things can happen.  If you experience persistent bleeding, fevers, changes in vision, severe headache or nasal pain, this may be the sign of an infection.  Any of these symptoms should be called into my office or to the on call ENT if after hours. There may be small plastic pieces placed inside your nose during surgery (splints). These help to promote the septum into healing in its straightened position, and will be removed  at the follow up visit.    Follow up - Splints will be removed at the follow up visit. This is simple and takes just a few seconds afterwards, the improvement you will expereince in breathing from the septoplasty is usually dramatic and immediate.      If there are any questions or issues with the above, or if there are other issues that concern you, always feel free to call [phone].    Stevenson Paetl MD  Otolaryngology    To contact Dr Patel call:    [phone] - Day  [phone] - After hours/weekends      You had 975 mg of Tylenol at 1142. You may repeat this after 5:42.   Maximum amount of Tylenol/Acetaminophen in a 24 hour period is 4,000 mg.

## 2023-07-17 NOTE — ANESTHESIA POSTPROCEDURE EVALUATION
Patient: Zeferino Reyna    Procedure: Procedure(s):  SEPTOPLASTY; BILATERAL INFERIOR TURBINATE REDUCTION       Anesthesia Type:  General    Note:  Disposition: Outpatient   Postop Pain Control: Uneventful            Sign Out: Well controlled pain   PONV: No   Neuro/Psych: Uneventful            Sign Out: Acceptable/Baseline neuro status   Airway/Respiratory: Uneventful            Sign Out: Acceptable/Baseline resp. status   CV/Hemodynamics: Uneventful            Sign Out: Acceptable CV status; No obvious hypovolemia; No obvious fluid overload   Other NRE:    DID A NON-ROUTINE EVENT OCCUR? No           Last vitals:  Vitals Value Taken Time   /106 07/17/23 1430   Temp 97.8  F (36.6  C) 07/17/23 1400   Pulse 76 07/17/23 1430   Resp 12 07/17/23 1430   SpO2 98 % 07/17/23 1430       Electronically Signed By: Joaquín Banks MD  July 17, 2023  3:14 PM

## 2023-07-21 ENCOUNTER — TELEPHONE (OUTPATIENT)
Dept: OTOLARYNGOLOGY | Facility: CLINIC | Age: 38
End: 2023-07-21
Payer: COMMERCIAL

## 2023-07-26 ENCOUNTER — OFFICE VISIT (OUTPATIENT)
Dept: OTOLARYNGOLOGY | Facility: CLINIC | Age: 38
End: 2023-07-26
Payer: COMMERCIAL

## 2023-07-26 VITALS
SYSTOLIC BLOOD PRESSURE: 132 MMHG | OXYGEN SATURATION: 100 % | HEART RATE: 83 BPM | RESPIRATION RATE: 18 BRPM | DIASTOLIC BLOOD PRESSURE: 75 MMHG

## 2023-07-26 DIAGNOSIS — Z98.890 S/P NASAL SEPTOPLASTY: Primary | ICD-10-CM

## 2023-07-26 PROCEDURE — 99024 POSTOP FOLLOW-UP VISIT: CPT | Performed by: OTOLARYNGOLOGY

## 2023-07-26 NOTE — LETTER
7/26/2023         RE: Zeferino Reyna  [address]  [address]  [place] [address]        Dear Colleague,    Thank you for referring your patient, Zeferino Reyna, to the [facility]. Please see a copy of my visit note below.    History of Present Illness - Zeferino Reyna is a 37 year old male who is status post septoplasty and inferior turbinate reduction on 7/17/23.  They had some bleeding for a few days, but that has stopped. Pain has been easily controlled. No evidence of infection.     Exam -   General - The patient is in no distress.  Alert, answers questions and cooperates with examination appropriately.   Nose - After removing the splints and debris with a suction, everything looks great.  The incision is well healed and the turbinates are well lateralized. The airway is wide open bilaterally. No sign of synechiae or infection. No hematoma or septal perforation.    A/P - Zeferino Reyna has had a nice result from septoplasty and inferior turbinate reduction.  The position of the septum and nasal tip look good.  I will see the patient for another follow up if needed.  I cautioned them to avoid any trauma to the nose, hard blowing, or twisting. They can gently irrigate with nasal saline 1-2 times daily, use nasal saline spray, and apply vaseline to the anterior septum as needed.       Again, thank you for allowing me to participate in the care of your patient.        Sincerely,        Stevenson Patel MD

## 2023-07-26 NOTE — PROGRESS NOTES
History of Present Illness - Zeferino Renya is a 37 year old male who is status post septoplasty and inferior turbinate reduction on 7/17/23.  They had some bleeding for a few days, but that has stopped. Pain has been easily controlled. No evidence of infection.     Exam -   General - The patient is in no distress.  Alert, answers questions and cooperates with examination appropriately.   Nose - After removing the splints and debris with a suction, everything looks great.  The incision is well healed and the turbinates are well lateralized. The airway is wide open bilaterally. No sign of synechiae or infection. No hematoma or septal perforation.    A/P - Zeferino Reyna has had a nice result from septoplasty and inferior turbinate reduction.  The position of the septum and nasal tip look good.  I will see the patient for another follow up if needed.  I cautioned them to avoid any trauma to the nose, hard blowing, or twisting. They can gently irrigate with nasal saline 1-2 times daily, use nasal saline spray, and apply vaseline to the anterior septum as needed.

## 2023-08-16 ENCOUNTER — TELEPHONE (OUTPATIENT)
Dept: OTOLARYNGOLOGY | Facility: CLINIC | Age: 38
End: 2023-08-16
Payer: COMMERCIAL

## 2023-08-16 NOTE — TELEPHONE ENCOUNTER
[facility] Call Center    Phone Message    May a detailed message be left on voicemail: yes     Reason for Call: Other: Pt had surgery and since the stents were removed he said his septum is not straight and he can't breathe out of the left nostril. Pt would like a call back to discuss. [place] location.  Thanks     Action Taken: Other: ENT    Travel Screening: Not Applicable

## 2023-08-17 NOTE — TELEPHONE ENCOUNTER
Called pt and spoke to him, advised this is not abn and it will take more time on one side than the other due to normal differences in the body and the anatomy. He should irrigated TID for some time to loosen up any debris on that side as he has not been good about this [facility] [place]. An appt was made incase this is needed later.    TAMMY Haq RN 8/17/2023 3:02 PM

## 2023-10-16 ENCOUNTER — OFFICE VISIT (OUTPATIENT)
Dept: PEDIATRICS | Facility: CLINIC | Age: 38
End: 2023-10-16
Payer: COMMERCIAL

## 2023-10-16 VITALS
BODY MASS INDEX: 21.93 KG/M2 | WEIGHT: 144.7 LBS | OXYGEN SATURATION: 100 % | SYSTOLIC BLOOD PRESSURE: 118 MMHG | TEMPERATURE: 97.3 F | RESPIRATION RATE: 16 BRPM | DIASTOLIC BLOOD PRESSURE: 71 MMHG | HEART RATE: 82 BPM | HEIGHT: 68 IN

## 2023-10-16 DIAGNOSIS — Z23 NEED FOR VACCINATION: ICD-10-CM

## 2023-10-16 DIAGNOSIS — F98.8 ATTENTION DEFICIT DISORDER (ADD) WITHOUT HYPERACTIVITY: ICD-10-CM

## 2023-10-16 DIAGNOSIS — N50.812 TESTICULAR PAIN, LEFT: Primary | ICD-10-CM

## 2023-10-16 PROCEDURE — 99213 OFFICE O/P EST LOW 20 MIN: CPT | Mod: 25 | Performed by: NURSE PRACTITIONER

## 2023-10-16 PROCEDURE — 90686 IIV4 VACC NO PRSV 0.5 ML IM: CPT | Performed by: NURSE PRACTITIONER

## 2023-10-16 PROCEDURE — 91320 SARSCV2 VAC 30MCG TRS-SUC IM: CPT | Mod: SL | Performed by: NURSE PRACTITIONER

## 2023-10-16 PROCEDURE — 90480 ADMN SARSCOV2 VAC 1/ONLY CMP: CPT | Performed by: NURSE PRACTITIONER

## 2023-10-16 PROCEDURE — 90471 IMMUNIZATION ADMIN: CPT | Performed by: NURSE PRACTITIONER

## 2023-10-16 RX ORDER — DEXTROAMPHETAMINE SACCHARATE, AMPHETAMINE ASPARTATE MONOHYDRATE, DEXTROAMPHETAMINE SULFATE AND AMPHETAMINE SULFATE 5; 5; 5; 5 MG/1; MG/1; MG/1; MG/1
20 CAPSULE, EXTENDED RELEASE ORAL DAILY
Qty: 30 CAPSULE | Refills: 0 | Status: SHIPPED | OUTPATIENT
Start: 2023-10-16 | End: 2024-03-19

## 2023-10-16 RX ORDER — DEXTROAMPHETAMINE SACCHARATE, AMPHETAMINE ASPARTATE, DEXTROAMPHETAMINE SULFATE AND AMPHETAMINE SULFATE 2.5; 2.5; 2.5; 2.5 MG/1; MG/1; MG/1; MG/1
10 TABLET ORAL DAILY PRN
Qty: 30 TABLET | Refills: 0 | Status: SHIPPED | OUTPATIENT
Start: 2023-10-16 | End: 2024-03-19

## 2023-10-16 ASSESSMENT — PATIENT HEALTH QUESTIONNAIRE - PHQ9
SUM OF ALL RESPONSES TO PHQ QUESTIONS 1-9: 5
10. IF YOU CHECKED OFF ANY PROBLEMS, HOW DIFFICULT HAVE THESE PROBLEMS MADE IT FOR YOU TO DO YOUR WORK, TAKE CARE OF THINGS AT HOME, OR GET ALONG WITH OTHER PEOPLE: SOMEWHAT DIFFICULT
SUM OF ALL RESPONSES TO PHQ QUESTIONS 1-9: 5

## 2023-10-16 ASSESSMENT — PAIN SCALES - GENERAL: PAINLEVEL: MILD PAIN (2)

## 2023-10-16 ASSESSMENT — ENCOUNTER SYMPTOMS: ABDOMINAL PAIN: 1

## 2023-10-16 NOTE — PATIENT INSTRUCTIONS
It was nice seeing you today.    Please let me know if you have any questions regarding today's visit!    Take care,    BRIDGETTE Pérez DNP  Family Medicine

## 2023-10-16 NOTE — PROGRESS NOTES
Assessment & Plan     Testicular pain, left  Present since August.    Will refer for US and urology consult.  - Adult Urology  Referral; Future  - US Testicular & Scrotum w Doppler Ltd; Future    Attention deficit disorder (ADD) without hyperactivity  Stable.  Medication refills given.  - amphetamine-dextroamphetamine (ADDERALL XR) 20 MG 24 hr capsule; Take 1 capsule (20 mg) by mouth daily  - amphetamine-dextroamphetamine (ADDERALL) 10 MG tablet; Take 1 tablet (10 mg) by mouth daily as needed (inability to focus)    Need for vaccination  - INFLUENZA VACCINE IM > 6 MONTHS VALENT IIV4 (AFLURIA/FLUZONE)  - COVID-19 12+ (2023-24) (PFIZER)      Jo-Ann Pérez NP  [facility] JOSIE Mcgarry is a 38 year old, presenting for the following health issues:    Testicular/scrotal Pain        10/16/2023     3:12 PM   Additional Questions   Roomed by Heaven VERA   Accompanied by NA         10/16/2023     3:12 PM   Patient Reported Additional Medications   Patient reports taking the following new medications NA       History of Present Illness       Reason for visit:  Testicular pain  Symptom onset:  1-2 weeks ago  Symptoms include:  Testicle pain/aching, twisting  Symptom intensity:  Mild  Symptom progression:  Staying the same  Had these symptoms before:  Yes  Has tried/received treatment for these symptoms:  No  What makes it worse:  Unsure  What makes it better:  Unsure    He eats 0-1 servings of fruits and vegetables daily.He consumes 0 sweetened beverage(s) daily.He exercises with enough effort to increase his heart rate 30 to 60 minutes per day.  He exercises with enough effort to increase his heart rate 5 days per week. He is missing 1 dose(s) of medications per week.  He is not taking prescribed medications regularly due to remembering to take.     Feels like testicles are in a different positions and aching.  L>R  Aching after physical activity.  Does not hurt if he is not being  "active  No swelling  No redness  No masses  No urinary issues.  No blood in urine  No perineum pain.      ADHD:  -Takes Adderall 20mg XR along with Adderall 10mg during the afternoon.  -Stable and controlled with Adderall  -Side effects: No palpitations, insomnia, elevated blood pressure, decreased appetite, weight loss, anxiety, nausea or abdominal pain.  -Monitoring:  Heart rate, blood pressure, height, and weight        Review of Systems   Gastrointestinal:  Positive for abdominal pain.   Genitourinary:  Positive for testicular pain.        Objective    /71 (BP Location: Right arm, Patient Position: Sitting, Cuff Size: Adult Regular)   Pulse 82   Temp 97.3  F (36.3  C) (Oral)   Resp 16   Ht 1.73 m (5' 8.11\")   Wt 65.6 kg (144 lb 11.2 oz)   SpO2 100%   BMI 21.93 kg/m    Body mass index is 21.93 kg/m .    Physical Exam   GENERAL: healthy, alert and no distress   (male): normal male genitalia without lesions or urethral discharge, no hernia.  No varicocele.   PSYCH: mentation appears normal, affect normal/bright              "

## 2023-10-20 ENCOUNTER — ANCILLARY PROCEDURE (OUTPATIENT)
Dept: ULTRASOUND IMAGING | Facility: CLINIC | Age: 38
End: 2023-10-20
Attending: NURSE PRACTITIONER
Payer: COMMERCIAL

## 2023-10-20 DIAGNOSIS — N50.812 TESTICULAR PAIN, LEFT: ICD-10-CM

## 2023-10-20 PROCEDURE — 93976 VASCULAR STUDY: CPT | Mod: GC | Performed by: RADIOLOGY

## 2023-10-20 PROCEDURE — 76870 US EXAM SCROTUM: CPT | Mod: GC | Performed by: RADIOLOGY

## 2024-03-19 ENCOUNTER — MYC REFILL (OUTPATIENT)
Dept: PEDIATRICS | Facility: CLINIC | Age: 39
End: 2024-03-19
Payer: COMMERCIAL

## 2024-03-19 DIAGNOSIS — F98.8 ATTENTION DEFICIT DISORDER (ADD) WITHOUT HYPERACTIVITY: ICD-10-CM

## 2024-03-19 RX ORDER — DEXTROAMPHETAMINE SACCHARATE, AMPHETAMINE ASPARTATE, DEXTROAMPHETAMINE SULFATE AND AMPHETAMINE SULFATE 2.5; 2.5; 2.5; 2.5 MG/1; MG/1; MG/1; MG/1
10 TABLET ORAL DAILY PRN
Qty: 30 TABLET | Refills: 0 | Status: SHIPPED | OUTPATIENT
Start: 2024-03-19 | End: 2024-05-27

## 2024-03-19 RX ORDER — DEXTROAMPHETAMINE SACCHARATE, AMPHETAMINE ASPARTATE MONOHYDRATE, DEXTROAMPHETAMINE SULFATE AND AMPHETAMINE SULFATE 5; 5; 5; 5 MG/1; MG/1; MG/1; MG/1
20 CAPSULE, EXTENDED RELEASE ORAL DAILY
Qty: 30 CAPSULE | Refills: 0 | Status: SHIPPED | OUTPATIENT
Start: 2024-03-19 | End: 2024-05-27

## 2024-05-02 NOTE — TELEPHONE ENCOUNTER
DOS 7/17/23 - septoplasty; bilateral inferior turbinate reduction  RN called and spoke to patient. Patient reports sinus pressure, congestion, and sinus headache. Everything just seems clogged. RN advised this was normal at this state of post op healing.   Advised very gentle sinus rinses with distilled water. Will not get up and through sinuses but may help with some of the congestion closer to the nares. Patient verbalized understanding.    Claire CHAMBERS, Specialty RN 7/21/2023 2:21 PM    
Please call patient regarding post op issues   He called surgery scheduling     Thank you     
No

## 2024-05-27 ENCOUNTER — MYC REFILL (OUTPATIENT)
Dept: PEDIATRICS | Facility: CLINIC | Age: 39
End: 2024-05-27
Payer: COMMERCIAL

## 2024-05-27 DIAGNOSIS — F98.8 ATTENTION DEFICIT DISORDER (ADD) WITHOUT HYPERACTIVITY: ICD-10-CM

## 2024-05-28 RX ORDER — DEXTROAMPHETAMINE SACCHARATE, AMPHETAMINE ASPARTATE MONOHYDRATE, DEXTROAMPHETAMINE SULFATE AND AMPHETAMINE SULFATE 5; 5; 5; 5 MG/1; MG/1; MG/1; MG/1
20 CAPSULE, EXTENDED RELEASE ORAL DAILY
Qty: 30 CAPSULE | Refills: 0 | Status: SHIPPED | OUTPATIENT
Start: 2024-05-28 | End: 2024-09-16

## 2024-05-28 RX ORDER — DEXTROAMPHETAMINE SACCHARATE, AMPHETAMINE ASPARTATE, DEXTROAMPHETAMINE SULFATE AND AMPHETAMINE SULFATE 2.5; 2.5; 2.5; 2.5 MG/1; MG/1; MG/1; MG/1
10 TABLET ORAL DAILY PRN
Qty: 30 TABLET | Refills: 0 | Status: SHIPPED | OUTPATIENT
Start: 2024-05-28 | End: 2024-09-16

## 2024-08-25 ENCOUNTER — HEALTH MAINTENANCE LETTER (OUTPATIENT)
Age: 39
End: 2024-08-25

## 2024-09-16 ENCOUNTER — MYC REFILL (OUTPATIENT)
Dept: PEDIATRICS | Facility: CLINIC | Age: 39
End: 2024-09-16
Payer: COMMERCIAL

## 2024-09-16 DIAGNOSIS — F98.8 ATTENTION DEFICIT DISORDER (ADD) WITHOUT HYPERACTIVITY: ICD-10-CM

## 2024-09-17 RX ORDER — DEXTROAMPHETAMINE SACCHARATE, AMPHETAMINE ASPARTATE, DEXTROAMPHETAMINE SULFATE AND AMPHETAMINE SULFATE 2.5; 2.5; 2.5; 2.5 MG/1; MG/1; MG/1; MG/1
10 TABLET ORAL DAILY PRN
Qty: 30 TABLET | Refills: 0 | Status: SHIPPED | OUTPATIENT
Start: 2024-09-17

## 2024-09-17 RX ORDER — DEXTROAMPHETAMINE SACCHARATE, AMPHETAMINE ASPARTATE MONOHYDRATE, DEXTROAMPHETAMINE SULFATE AND AMPHETAMINE SULFATE 5; 5; 5; 5 MG/1; MG/1; MG/1; MG/1
20 CAPSULE, EXTENDED RELEASE ORAL DAILY
Qty: 30 CAPSULE | Refills: 0 | Status: SHIPPED | OUTPATIENT
Start: 2024-09-17

## 2024-10-23 NOTE — TELEPHONE ENCOUNTER
MEDICAL RECORDS REQUEST   [facility]  Urology Clinic  [address] [place] [address]  PHONE: [phone]  Fax: [phone]        FUTURE VISIT INFORMATION                                                   Zeferino Reyna, : 1985 scheduled for future visit at [facility]    APPOINTMENT INFORMATION:  Date: 12/3/24 @ 7:00 am   Provider:  Stevenson Duvall PA-C   Reason for Visit/Diagnosis: Testicular pain, left     REFERRAL INFORMATION:  Referring provider:  Jo-Ann Pérez NP   Specialty: Family Medicine  Referring providers clinic:  [facility]  Clinic contact number:  [phone]     RECORDS REQUESTED FOR VISIT                                                     NOTES  STATUS/DETAILS   OFFICE NOTE from referring provider  yes  10/16/23 Jo-Ann Pérez NP @[facility]     MEDICATION LIST  yes   LABS     URINALYSIS (UA)  yes   URINE CYTOLOGY  yes   SCROTAL AND TESTICULAR ULTRASOUND (IMAGES & REPORT)  yes  [facility]  10/20/23 US Testicular & Scrotum       PRE-VISIT CHECKLIST      Joint diagnostic appointment coordinated correctly          (ensure right order & amount of time) Yes   RECORD COLLECTION COMPLETE Yes

## 2024-11-15 ENCOUNTER — PRE VISIT (OUTPATIENT)
Dept: UROLOGY | Facility: CLINIC | Age: 39
End: 2024-11-15
Payer: COMMERCIAL

## 2024-11-15 NOTE — TELEPHONE ENCOUNTER
Reason for visit: Left testicular pain     Relevant information: per testicular pain referral via Jo-Ann Pérez NP on 10/16/23, pt stated onset was 2 weeks prior to visit (10/2/24), aching, twisting symptoms that they've had before. Mild pain associated with it    Records/imaging/labs/orders: All records available    At Rooming:  Standard rooming      Olman Fabian  11/15/2024  12:47 PM

## 2024-11-20 ENCOUNTER — MYC REFILL (OUTPATIENT)
Dept: PEDIATRICS | Facility: CLINIC | Age: 39
End: 2024-11-20
Payer: COMMERCIAL

## 2024-11-20 DIAGNOSIS — F98.8 ATTENTION DEFICIT DISORDER (ADD) WITHOUT HYPERACTIVITY: ICD-10-CM

## 2024-11-20 RX ORDER — DEXTROAMPHETAMINE SACCHARATE, AMPHETAMINE ASPARTATE MONOHYDRATE, DEXTROAMPHETAMINE SULFATE AND AMPHETAMINE SULFATE 5; 5; 5; 5 MG/1; MG/1; MG/1; MG/1
20 CAPSULE, EXTENDED RELEASE ORAL DAILY
Qty: 30 CAPSULE | Refills: 0 | Status: SHIPPED | OUTPATIENT
Start: 2024-11-20

## 2024-11-20 RX ORDER — DEXTROAMPHETAMINE SACCHARATE, AMPHETAMINE ASPARTATE, DEXTROAMPHETAMINE SULFATE AND AMPHETAMINE SULFATE 2.5; 2.5; 2.5; 2.5 MG/1; MG/1; MG/1; MG/1
10 TABLET ORAL DAILY PRN
Qty: 30 TABLET | Refills: 0 | Status: SHIPPED | OUTPATIENT
Start: 2024-11-20

## 2024-12-02 NOTE — PROGRESS NOTES
"Subjective     REFERRING PROVIDER  Jo-Ann Pérez CNP    REASON FOR VISIT  Testcular pain     HISTORY OF PRESENT ILLNESS  Mr. Reyna is a pleasant 39 year old male with a past medical history significant for attention deficit disorder, anxiety, and depression who presents today for testicular pain.  I personally reviewed the family practice visit note from 10/16/2023 in preparation for today's visit.    It appears that in October 2023, Zeferino booth began to experience mild aching testicular pain, left greater than right, and was made worse after physical activity.  Recommendation was to get an ultrasound and refer to urology for further discussion and evaluation.  Testicular ultrasound just showed evidence of a mild left-sided varicocele which was thought to potentially be the cause of the pain.  Recommendation was to continue to meet with urology, but this did not happen in 2023.    Today:  Remembers having an issue with this pain when he used to play soccer without any compression shorts  Pain came back since he has been running   Only ever on the left  Can take hours to stop hurting after activity   Left testicle also can feel like it is \"out of position\"   No palpable testicular abnormalities   No other pelvic pain associated with this   No current issues with fertility   No gross hematuria to hematospermia   No history of kidney stones   No history of urinary retention   No history of UTIs or prostatitis     REVIEW OF SYSTEMS  Review of Systems   Constitutional:  Negative for fatigue and unexpected weight change.   HENT:  Negative for hearing loss.    Eyes:  Negative for visual disturbance.   Respiratory:  Negative for shortness of breath.    Cardiovascular:  Negative for chest pain.   Gastrointestinal:  Negative for abdominal pain and constipation.   Genitourinary:  Negative for hematuria.   Musculoskeletal:  Negative for back pain.   Neurological:  Negative for dizziness and light-headedness.   Hematological:  " Negative for adenopathy.   Psychiatric/Behavioral:  Positive for sleep disturbance.      Social History:  Denies any history of or current smoking     Family History:  Denies any known family history of urologic malignancy     Grandmother  of a cancer    Mother possible with thyroid cancer     Objective     PHYSICAL EXAM  Physical Exam  Constitutional:       Appearance: Normal appearance.   HENT:      Head: Normocephalic and atraumatic.      Nose: No congestion.      Mouth/Throat:      Mouth: Mucous membranes are moist.   Eyes:      General:         Right eye: No discharge.         Left eye: No discharge.   Pulmonary:      Effort: No respiratory distress.   Abdominal:      General: There is no distension.   Genitourinary:     Comments: Circumcised phallus with no evidence of obvious lesion.  Bilateral testicles were palpated with no evidence of nodularity or induration.  Epididymitis were palpated with no evidence of cyst or discomfort.  Spermatic cords were palpated bilaterally, and enlarged veins were able to be palpated with and without Valsalva on the left-hand side as compared to the right-hand side.  Musculoskeletal:         General: No deformity.   Skin:     General: Skin is warm and dry.   Neurological:      Mental Status: He is alert and oriented to person, place, and time.   Psychiatric:         Mood and Affect: Mood normal.         Behavior: Behavior normal.       IMAGING  I personally reviewed and interpreted the below testicular ultrasound from 10/20/2023 and agree that there was no concerning testicular findings, only a small left-sided varicocele    Narrative & Impression   US TESTICULAR AND SCROTUM WITH DOPPLER LIMITED  10/20/2023 12:57 PM       CLINICAL HISTORY: Testicular pain, left     COMPARISON: None         PROCEDURE COMMENTS: Ultrasound of the scrotum was performed with color  and spectral Doppler.     FINDINGS:  Right testis: 3.1 x 1.8 x 3.7 cm.  Left testis: 2.9 x 2.3 x 4.1 cm.     The  testes are normal in size, shape, and echotexture, and are located  within the scrotum. The epididymides are normal. No hydrocele or  abnormal mass. Mild left varicocele with veins of the left pampiniform  plexus measuring just over 3 mm with Valsalva maneuver.      There is normal testicular blood flow as documented by both color  Doppler evaluation and spectral Doppler waveforms.  There is no  evidence of testicular torsion.                                                                      IMPRESSION:  1. No testicular mass or torsion.  2. Mild left varicocele     I have personally reviewed the examination and initial interpretation  and I agree with the findings.     JOHANA MARCELO,          SYSTEM ID:  [id number]     Assessment & Plan   Left sided Testcular pain in the setting of a left-sided varicocele  Anxiety  Depression     It was my pleasure to meet with Mr. Reyna in the office today in regards to his worsening left sided testicular discomfort likely associated with a left-sided varicocele.  After reviewing his clinical history and completing a physical exam, we discussed that it does appear that he continues to have a left-sided varicocele, and that I actually agree it is likely the cause of his discomfort.  We discussed the many different etiologies of testicular pain, and specifically how his unilateral nonradiating left-sided pain that is only associated with activity very much fits the bill of a problematic varicocele.    We discussed different conservative measures for this type of testicular discomfort associated with the varicocele including NSAIDs such as Tylenol or ibuprofen taken before activities, well supportive underwear, and ice or heat (10 minutes on 10 minutes off, no direct contact with the temperature source).  However, it is worth noting that these are not fixes for the problem but rather Band-Aids or symptomatic treatment.  We then switched our discussion over the potential fixes  including varicocele embolization versus microscopic varicocele repair.  At this point Zeferino is leaning more towards fixes such as the surgical options, so we will plan to update a testicular ultrasound and refer him to one of my surgeons for discussion of surgical options for varicocele repair.    Mr. Reyna expressed understanding and agreement to the above discussion and plan and all of his questions were answered to his satisfaction.     PLAN  First available repeat testicular ultrasound with follow-up visit with Dr. Hope shortly afterwards    Signed by:    Stevenson Duvall PA-C    I spent a total of 40 minutes spent on the date of the encounter doing chart review, history and exam, documentation, and further activities as noted above.

## 2024-12-03 ENCOUNTER — OFFICE VISIT (OUTPATIENT)
Dept: UROLOGY | Facility: CLINIC | Age: 39
End: 2024-12-03
Payer: COMMERCIAL

## 2024-12-03 ENCOUNTER — PRE VISIT (OUTPATIENT)
Dept: UROLOGY | Facility: CLINIC | Age: 39
End: 2024-12-03

## 2024-12-03 ENCOUNTER — TELEPHONE (OUTPATIENT)
Dept: UROLOGY | Facility: CLINIC | Age: 39
End: 2024-12-03

## 2024-12-03 VITALS — OXYGEN SATURATION: 99 % | DIASTOLIC BLOOD PRESSURE: 86 MMHG | HEART RATE: 96 BPM | SYSTOLIC BLOOD PRESSURE: 123 MMHG

## 2024-12-03 DIAGNOSIS — I86.1 LEFT VARICOCELE: ICD-10-CM

## 2024-12-03 DIAGNOSIS — N50.812 PAIN IN LEFT TESTICLE: Primary | ICD-10-CM

## 2024-12-03 PROCEDURE — 99204 OFFICE O/P NEW MOD 45 MIN: CPT | Performed by: STUDENT IN AN ORGANIZED HEALTH CARE EDUCATION/TRAINING PROGRAM

## 2024-12-03 ASSESSMENT — ENCOUNTER SYMPTOMS
CONSTIPATION: 0
SLEEP DISTURBANCE: 1
ABDOMINAL PAIN: 0
LIGHT-HEADEDNESS: 0
UNEXPECTED WEIGHT CHANGE: 0
SHORTNESS OF BREATH: 0
HEMATURIA: 0
DIZZINESS: 0
ADENOPATHY: 0
FATIGUE: 0
BACK PAIN: 0

## 2024-12-03 ASSESSMENT — PAIN SCALES - GENERAL: PAINLEVEL_OUTOF10: NO PAIN (0)

## 2024-12-03 NOTE — OUTPATIENT NURSE NOTE
Patient declined the depression screening.  This writer asked the patient if he is currently seeing someone to treat his depression, and he stated that he was seeing someone for this.  Patient stated he talks with his primary care provider about this problem.    Heidy Mancuso LPN  12/03/24  8:17 AM

## 2024-12-03 NOTE — TELEPHONE ENCOUNTER
Patient confirmed scheduled appointment:  Date: 12/17  Time: 9:30  Visit type: RTN  Provider: Jayy  Location: [facility]  Testing/imaging: Ultrasound scheduled at [facility]  Additional notes: NA

## 2024-12-03 NOTE — NURSING NOTE
Chief Complaint   Patient presents with    Testicular/scrotal Pain       Blood pressure 123/86, pulse 96, SpO2 99%. There is no height or weight on file to calculate BMI.    Patient Active Problem List   Diagnosis    ADD (attention deficit disorder)    Chondromalacia patellae    CARDIOVASCULAR SCREENING; LDL GOAL LESS THAN 160    Adjustment disorder with mixed anxiety and depressed mood    Moderate major depression (H)    Nasal obstruction    Nasal septal deviation    Nasal turbinate hypertrophy       Allergies   Allergen Reactions    Codeine Sulfate Nausea and Vomiting    Codeine Nausea and Vomiting       Current Outpatient Medications   Medication Sig Dispense Refill    amphetamine-dextroamphetamine (ADDERALL XR) 20 MG 24 hr capsule Take 1 capsule (20 mg) by mouth daily. 30 capsule 0    amphetamine-dextroamphetamine (ADDERALL) 10 MG tablet Take 1 tablet (10 mg) by mouth daily as needed (inability to focus). 30 tablet 0    Multiple Vitamins-Minerals (MULTIVITAMIN OR) Take 1 tablet by mouth daily         Social History     Tobacco Use    Smoking status: Never    Smokeless tobacco: Never   Substance Use Topics    Alcohol use: Yes     Comment: 8 drinks weekly    Drug use: No       Heidy Mancuso LPN  12/3/2024  7:01 AM

## 2024-12-03 NOTE — LETTER
"12/3/2024       RE: Zeferino Reyna  [address]  [address]  [place] [address]     Dear Colleague,    Thank you for referring your patient, Zeferino Reyna, to the [facility] [place] at [facility]. Please see a copy of my visit note below.    Subjective    REFERRING PROVIDER  Jo-Ann Pérez CNP    REASON FOR VISIT  Testcular pain     HISTORY OF PRESENT ILLNESS  Mr. Reyna is a pleasant 39 year old male with a past medical history significant for attention deficit disorder, anxiety, and depression who presents today for testicular pain.  I personally reviewed the family practice visit note from 10/16/2023 in preparation for today's visit.    It appears that in October 2023, Zeferino just began to experience mild aching testicular pain, left greater than right, and was made worse after physical activity.  Recommendation was to get an ultrasound and refer to urology for further discussion and evaluation.  Testicular ultrasound just showed evidence of a mild left-sided varicocele which was thought to potentially be the cause of the pain.  Recommendation was to continue to meet with urology, but this did not happen in 2023.    Today:  Remembers having an issue with this pain when he used to play soccer without any compression shorts  Pain came back since he has been running   Only ever on the left  Can take hours to stop hurting after activity   Left testicle also can feel like it is \"out of position\"   No palpable testicular abnormalities   No other pelvic pain associated with this   No current issues with fertility   No gross hematuria to hematospermia   No history of kidney stones   No history of urinary retention   No history of UTIs or prostatitis     REVIEW OF SYSTEMS  Review of Systems   Constitutional:  Negative for fatigue and unexpected weight change.   HENT:  Negative for hearing loss.    Eyes:  Negative for visual disturbance. "   Respiratory:  Negative for shortness of breath.    Cardiovascular:  Negative for chest pain.   Gastrointestinal:  Negative for abdominal pain and constipation.   Genitourinary:  Negative for hematuria.   Musculoskeletal:  Negative for back pain.   Neurological:  Negative for dizziness and light-headedness.   Hematological:  Negative for adenopathy.   Psychiatric/Behavioral:  Positive for sleep disturbance.      Social History:  Denies any history of or current smoking     Family History:  Denies any known family history of urologic malignancy     Grandmother  of a cancer    Mother possible with thyroid cancer     Objective    PHYSICAL EXAM  Physical Exam  Constitutional:       Appearance: Normal appearance.   HENT:      Head: Normocephalic and atraumatic.      Nose: No congestion.      Mouth/Throat:      Mouth: Mucous membranes are moist.   Eyes:      General:         Right eye: No discharge.         Left eye: No discharge.   Pulmonary:      Effort: No respiratory distress.   Abdominal:      General: There is no distension.   Genitourinary:     Comments: Circumcised phallus with no evidence of obvious lesion.  Bilateral testicles were palpated with no evidence of nodularity or induration.  Epididymitis were palpated with no evidence of cyst or discomfort.  Spermatic cords were palpated bilaterally, and enlarged veins were able to be palpated with and without Valsalva on the left-hand side as compared to the right-hand side.  Musculoskeletal:         General: No deformity.   Skin:     General: Skin is warm and dry.   Neurological:      Mental Status: He is alert and oriented to person, place, and time.   Psychiatric:         Mood and Affect: Mood normal.         Behavior: Behavior normal.       IMAGING  I personally reviewed and interpreted the below testicular ultrasound from 10/20/2023 and agree that there was no concerning testicular findings, only a small left-sided varicocele    Narrative & Impression   US  TESTICULAR AND SCROTUM WITH DOPPLER LIMITED  10/20/2023 12:57 PM       CLINICAL HISTORY: Testicular pain, left     COMPARISON: None         PROCEDURE COMMENTS: Ultrasound of the scrotum was performed with color  and spectral Doppler.     FINDINGS:  Right testis: 3.1 x 1.8 x 3.7 cm.  Left testis: 2.9 x 2.3 x 4.1 cm.     The testes are normal in size, shape, and echotexture, and are located  within the scrotum. The epididymides are normal. No hydrocele or  abnormal mass. Mild left varicocele with veins of the left pampiniform  plexus measuring just over 3 mm with Valsalva maneuver.      There is normal testicular blood flow as documented by both color  Doppler evaluation and spectral Doppler waveforms.  There is no  evidence of testicular torsion.                                                                      IMPRESSION:  1. No testicular mass or torsion.  2. Mild left varicocele     I have personally reviewed the examination and initial interpretation  and I agree with the findings.     JOHANA MARCELO,          SYSTEM ID:  [id number]     Assessment & Plan  Left sided Testcular pain in the setting of a left-sided varicocele  Anxiety  Depression     It was my pleasure to meet with Mr. Reyna in the office today in regards to his worsening left sided testicular discomfort likely associated with a left-sided varicocele.  After reviewing his clinical history and completing a physical exam, we discussed that it does appear that he continues to have a left-sided varicocele, and that I actually agree it is likely the cause of his discomfort.  We discussed the many different etiologies of testicular pain, and specifically how his unilateral nonradiating left-sided pain that is only associated with activity very much fits the bill of a problematic varicocele.    We discussed different conservative measures for this type of testicular discomfort associated with the varicocele including NSAIDs such as Tylenol or ibuprofen  taken before activities, well supportive underwear, and ice or heat (10 minutes on 10 minutes off, no direct contact with the temperature source).  However, it is worth noting that these are not fixes for the problem but rather Band-Aids or symptomatic treatment.  We then switched our discussion over the potential fixes including varicocele embolization versus microscopic varicocele repair.  At this point Zeferino is leaning more towards fixes such as the surgical options, so we will plan to update a testicular ultrasound and refer him to one of my surgeons for discussion of surgical options for varicocele repair.    Mr. Reyna expressed understanding and agreement to the above discussion and plan and all of his questions were answered to his satisfaction.     PLAN  First available repeat testicular ultrasound with follow-up visit with Dr. Hope shortly afterwards    Signed by:    Stevenson Duvall PA-C    I spent a total of 40 minutes spent on the date of the encounter doing chart review, history and exam, documentation, and further activities as noted above.       Again, thank you for allowing me to participate in the care of your patient.      Sincerely,    Stevenson Duvall PA-C

## 2024-12-16 ENCOUNTER — ANCILLARY PROCEDURE (OUTPATIENT)
Dept: ULTRASOUND IMAGING | Facility: CLINIC | Age: 39
End: 2024-12-16
Attending: STUDENT IN AN ORGANIZED HEALTH CARE EDUCATION/TRAINING PROGRAM
Payer: COMMERCIAL

## 2024-12-16 DIAGNOSIS — N50.812 PAIN IN LEFT TESTICLE: ICD-10-CM

## 2024-12-16 PROCEDURE — 76870 US EXAM SCROTUM: CPT | Mod: GC | Performed by: RADIOLOGY

## 2024-12-16 PROCEDURE — 99207 US TESTICULAR AND SCROTUM WITH DOPPLER LIMITED: CPT | Performed by: RADIOLOGY

## 2024-12-16 NOTE — PROGRESS NOTES
UROLOGY CLINIC VISIT    Chief Complaint:  Scrotal pain      Referring Provider:  Ethan Adams    Subjective:     Zeferino Reyna is a 39 year old male with a PMHx significant for those items listed, who is presenting to clinic today to discuss scrotal pain bilaterally.    October 2023, Zeferino booth began to experience mild aching testicular pain, left greater than right, and was made worse after physical activity. Recommendation was to get an ultrasound and refer to urology for further discussion and evaluation. Testicular ultrasound just showed evidence of a mild left-sided varicocele which was thought to potentially be the cause of the pain. Recommendation was to continue to meet with urology, but this did not happen in 2023.     Dull aching pain since teenager. Started to get worse last year. Is worse with activities like running hiking. Was sharper hiking 1 year ago gotten better.  Seems to be steadily improving over the last several weeks to months  Better in the AM, worse as the day goes on.     Fertility, not interested currently.     Voiding complaints include, none   Does not endorse constipation   Does not endorse EJD, ED none     Scrotal US 12/16/24  1.  Normal sonographic evaluation of the testes.  2.  Stable to minimally increased left varicocele.    Has tried RICE so far to little effect, mainly ice helped    None STD in past    PMH: ADD     Currently the patient has no fever, chills, nausea, vomiting or other signs/symptoms suggestive of acute systemic infection.    PMH  Past Medical History:   Diagnosis Date    ADD (attention deficit disorder)     Depression inactive     Eczema      PSH  Past Surgical History:   Procedure Laterality Date    HC TOOTH EXTRACTION W/FORCEP      SEPTOPLASTY, TURBINOPLASTY, COMBINED Bilateral 7/17/2023    Procedure: SEPTOPLASTY; BILATERAL INFERIOR TURBINATE REDUCTION;  Surgeon: Stevenson Patel MD;  Location:  OR     [facility]  Family History   Problem Relation Age of Onset  "   No Known Problems Mother     Anxiety Disorder Father     Cerebrovascular Disease Maternal Grandmother     Breast Cancer Paternal Grandmother     GERD Brother 30    No Known Problems Maternal Grandfather     GERD Brother 30    No Known Problems Paternal Grandfather      ALLERGY  Allergies   Allergen Reactions    Codeine Sulfate Nausea and Vomiting    Codeine Nausea and Vomiting     MEDICATIONS  has a current medication list which includes the following prescription(s): amphetamine-dextroamphetamine, amphetamine-dextroamphetamine, and multiple vitamin.  ROS:  Constitutional: negative  Eyes: negative  Ears/Nose/Throat/Mouth: negative  Respiratory: negative  Cardiovascular: negative  Gastrointestinal: negative  Genito-Urinary: as documented above in HPI  Musculoskeletal: negative  Neurological: negative  Integumentary: negative      Objective:     /78 (BP Location: Left arm, Patient Position: Sitting, Cuff Size: Adult Regular)   Pulse 84   Temp 97.3  F (36.3  C) (Temporal)    Physical Exam:  GENERAL: Patient is AOx3, in no acute distress  CARDIAC: regular rate  LUNG: breathing non labored  ABD: soft, nondistended  : normal circumcised phallus, testes descended bilaterally  No palpable peyronie's plaques  Varicocele: L G1 Vx decompresses supine    Prostate: defer      LABORATORY:  No results found for: \"CREATININE\"  No results found for: \"PSA\"  * No surgery found *    Assessment:       Zeferino Reyna is a 39 year old male with a PMHx significant for those items listed, who is presenting to clinic today to discuss left scrotal pain varicocele     Plan:        Varicocele  Scrotal pain  - Rest, Ice, Compression, Elevation  - Offered fertility testing, declined that at this point  -Discussed that patient does have relatively classic symptoms for varicocele pain.  Patient was offered varicocelectomy for pain, however given the fact that he feels like he is not improving would like to potentially hold off on " surgery.  -Given his mechanism of original injury as well as his symptoms of tight/pelvic floor recommend pelvic floor physical therapy while he is deciding on surgery  -Patient will try physical therapy will continue to monitor for improvement.  If he is not improving or has a relapse or regression he is interested in varicocelectomy.  Will contact the clinic in this instance to book surgery at that time    ROV 2-3 months if not improving    We discussed the different indications for repairing a varicocele including pain, fertility concerns, and testicular size discrepancy in a pediatric population. We discussed the procedure in detail including the risks of pain, infection bleeding, injury to the testicle and surrounding structures, possible need for further surgery, possible anesthetic complications, possible recurrence, possible hydrocele formation. We discussed that after repairing a varicocele not everyone has an improvement in pain only about 50-70%. We also discussed that a varicocelectomy has level 1 evidence of improvement on standard semen parameters (motility, count) in addition to morphology. We further discussed that varicocele repair may impact parameters that are not measured on a routine semen analysis such as sperm DFI and ROS. Varicocele repair may help with relief of pain but the only way to determine this is to repair the varicocele and determine if pain improves. We also discussed embolization of the varicocele and the risks and benefits and success rates of this as well.

## 2024-12-17 ENCOUNTER — OFFICE VISIT (OUTPATIENT)
Dept: UROLOGY | Facility: CLINIC | Age: 39
End: 2024-12-17
Payer: COMMERCIAL

## 2024-12-17 VITALS — DIASTOLIC BLOOD PRESSURE: 78 MMHG | HEART RATE: 84 BPM | TEMPERATURE: 97.3 F | SYSTOLIC BLOOD PRESSURE: 132 MMHG

## 2024-12-17 DIAGNOSIS — I86.1 VARICOCELE: ICD-10-CM

## 2024-12-17 DIAGNOSIS — N50.82 SCROTAL PAIN: Primary | ICD-10-CM

## 2024-12-17 PROCEDURE — 99203 OFFICE O/P NEW LOW 30 MIN: CPT | Performed by: STUDENT IN AN ORGANIZED HEALTH CARE EDUCATION/TRAINING PROGRAM

## 2024-12-17 ASSESSMENT — PAIN SCALES - GENERAL: PAINLEVEL_OUTOF10: NO PAIN (0)

## 2025-01-21 ENCOUNTER — OFFICE VISIT (OUTPATIENT)
Dept: PEDIATRICS | Facility: CLINIC | Age: 40
End: 2025-01-21
Payer: COMMERCIAL

## 2025-01-21 VITALS
OXYGEN SATURATION: 99 % | RESPIRATION RATE: 16 BRPM | HEIGHT: 68 IN | SYSTOLIC BLOOD PRESSURE: 126 MMHG | DIASTOLIC BLOOD PRESSURE: 85 MMHG | TEMPERATURE: 97.7 F | WEIGHT: 147.8 LBS | BODY MASS INDEX: 22.4 KG/M2 | HEART RATE: 86 BPM

## 2025-01-21 DIAGNOSIS — Z00.00 ROUTINE GENERAL MEDICAL EXAMINATION AT A HEALTH CARE FACILITY: Primary | ICD-10-CM

## 2025-01-21 DIAGNOSIS — F90.0 ATTENTION DEFICIT HYPERACTIVITY DISORDER (ADHD), PREDOMINANTLY INATTENTIVE TYPE: ICD-10-CM

## 2025-01-21 DIAGNOSIS — L30.9 DERMATITIS: ICD-10-CM

## 2025-01-21 DIAGNOSIS — Z23 NEED FOR VACCINATION: ICD-10-CM

## 2025-01-21 PROBLEM — F32.9 MAJOR DEPRESSION: Status: RESOLVED | Noted: 2017-10-03 | Resolved: 2025-01-21

## 2025-01-21 PROBLEM — F43.23 ADJUSTMENT DISORDER WITH MIXED ANXIETY AND DEPRESSED MOOD: Status: RESOLVED | Noted: 2017-06-05 | Resolved: 2025-01-21

## 2025-01-21 PROBLEM — J34.3 NASAL TURBINATE HYPERTROPHY: Status: RESOLVED | Noted: 2023-05-12 | Resolved: 2025-01-21

## 2025-01-21 PROBLEM — J34.89 NASAL OBSTRUCTION: Status: RESOLVED | Noted: 2023-05-12 | Resolved: 2025-01-21

## 2025-01-21 PROBLEM — J34.2 NASAL SEPTAL DEVIATION: Status: RESOLVED | Noted: 2023-05-12 | Resolved: 2025-01-21

## 2025-01-21 PROCEDURE — 82947 ASSAY GLUCOSE BLOOD QUANT: CPT | Performed by: INTERNAL MEDICINE

## 2025-01-21 PROCEDURE — 90656 IIV3 VACC NO PRSV 0.5 ML IM: CPT | Performed by: INTERNAL MEDICINE

## 2025-01-21 PROCEDURE — 99395 PREV VISIT EST AGE 18-39: CPT | Mod: 25 | Performed by: INTERNAL MEDICINE

## 2025-01-21 PROCEDURE — 99213 OFFICE O/P EST LOW 20 MIN: CPT | Mod: 25 | Performed by: INTERNAL MEDICINE

## 2025-01-21 PROCEDURE — 80061 LIPID PANEL: CPT | Performed by: INTERNAL MEDICINE

## 2025-01-21 PROCEDURE — 91320 SARSCV2 VAC 30MCG TRS-SUC IM: CPT | Performed by: INTERNAL MEDICINE

## 2025-01-21 PROCEDURE — 90471 IMMUNIZATION ADMIN: CPT | Performed by: INTERNAL MEDICINE

## 2025-01-21 PROCEDURE — 90472 IMMUNIZATION ADMIN EACH ADD: CPT | Performed by: INTERNAL MEDICINE

## 2025-01-21 PROCEDURE — 36415 COLL VENOUS BLD VENIPUNCTURE: CPT | Performed by: INTERNAL MEDICINE

## 2025-01-21 PROCEDURE — 90746 HEPB VACCINE 3 DOSE ADULT IM: CPT | Performed by: INTERNAL MEDICINE

## 2025-01-21 PROCEDURE — 90480 ADMN SARSCOV2 VAC 1/ONLY CMP: CPT | Performed by: INTERNAL MEDICINE

## 2025-01-21 RX ORDER — FLUOCINONIDE TOPICAL SOLUTION USP, 0.05% 0.5 MG/ML
SOLUTION TOPICAL 2 TIMES DAILY
Qty: 60 ML | Refills: 1 | Status: SHIPPED | OUTPATIENT
Start: 2025-01-21

## 2025-01-21 SDOH — HEALTH STABILITY: PHYSICAL HEALTH: ON AVERAGE, HOW MANY MINUTES DO YOU ENGAGE IN EXERCISE AT THIS LEVEL?: 40 MIN

## 2025-01-21 SDOH — HEALTH STABILITY: PHYSICAL HEALTH: ON AVERAGE, HOW MANY DAYS PER WEEK DO YOU ENGAGE IN MODERATE TO STRENUOUS EXERCISE (LIKE A BRISK WALK)?: 5 DAYS

## 2025-01-21 ASSESSMENT — PATIENT HEALTH QUESTIONNAIRE - PHQ9
SUM OF ALL RESPONSES TO PHQ QUESTIONS 1-9: 1
SUM OF ALL RESPONSES TO PHQ QUESTIONS 1-9: 1
10. IF YOU CHECKED OFF ANY PROBLEMS, HOW DIFFICULT HAVE THESE PROBLEMS MADE IT FOR YOU TO DO YOUR WORK, TAKE CARE OF THINGS AT HOME, OR GET ALONG WITH OTHER PEOPLE: NOT DIFFICULT AT ALL

## 2025-01-21 ASSESSMENT — PAIN SCALES - GENERAL: PAINLEVEL_OUTOF10: NO PAIN (0)

## 2025-01-21 ASSESSMENT — SOCIAL DETERMINANTS OF HEALTH (SDOH): HOW OFTEN DO YOU GET TOGETHER WITH FRIENDS OR RELATIVES?: THREE TIMES A WEEK

## 2025-01-21 NOTE — PROGRESS NOTES
Preventive Care Visit  [facility]  Ethan Adams MD, Internal Medicine - Pediatrics  Jan 21, 2025      Assessment & Plan       ICD-10-CM    1. Routine general medical examination at a health care facility  Z00.00 Glucose     Lipid panel reflex to direct LDL Fasting     Glucose     Lipid panel reflex to direct LDL Fasting      2. Attention deficit hyperactivity disorder (ADHD), predominantly inattentive type  F90.0       3. Dermatitis  L30.9 Adult Dermatology  Referral     fluocinonide (LIDEX) 0.05 % external solution      4. Need for vaccination  Z23 INFLUENZA VACCINE,SPLIT VIRUS,TRIVALENT,PF(FLUZONE)     COVID-19 12+ (PFIZER)     HEPATITIS B, ADULT 20+ (ENGERIX-B/RECOMBIVAX HB)        Here for CPE. Overall is doing well. HM reviewed.    ADHD. Well controlled w/ current medications.    Dermatitis. Clinically appears may be psoriasis. Discussed management options. Trial of Lidex solution, can apply to palms and soles of the feet as well. Recommend dermatology consultation, referral signed.     Ethan Adams MD      Edgar Mcgarry is a 39 year old, presenting for the following:  Physical        1/21/2025    10:24 AM   Additional Questions   Roomed by [facility]          HPI  Here for CPE. Overall he is feeling well. HM reviewed.    ADHD. Well controlled w/ current medications.     Skin concerns. Has areas of dermatitis, largest on the mid upper forehead to scalp. Also sole of left foot and 2 smaller areas on the palms of the hands. Used moisturizer and OTC antifungal to the sole of the foot w/ improvement previously. Other areas are newer as being affected.           1/21/2025   General Health   How would you rate your overall physical health? Excellent   Feel stress (tense, anxious, or unable to sleep) Only a little   (!) STRESS CONCERN      1/21/2025   Nutrition   Three or more servings of calcium each day? (!) NO   Diet: Regular (no restrictions)   How many servings of fruit and  vegetables per day? (!) 0-1   How many sweetened beverages each day? 0-1         1/21/2025   Exercise   Days per week of moderate/strenous exercise 5 days   Average minutes spent exercising at this level 40 min         1/21/2025   Social Factors   Frequency of gathering with friends or relatives Three times a week   Worry food won't last until get money to buy more No   Food not last or not have enough money for food? No   Do you have housing? (Housing is defined as stable permanent housing and does not include staying ouside in a car, in a tent, in an abandoned building, in an overnight shelter, or couch-surfing.) Yes   Are you worried about losing your housing? No   Lack of transportation? No   Unable to get utilities (heat,electricity)? No         1/21/2025   Dental   Dentist two times every year? Yes         1/21/2025   TB Screening   Were you born outside of the [place]? No       Today's PHQ-9 Score:       1/21/2025    10:15 AM   PHQ-9 SCORE   PHQ-9 Total Score MyChart 1 (Minimal depression)   PHQ-9 Total Score 1        Patient-reported         1/21/2025   Substance Use   Alcohol more than 3/day or more than 7/wk Yes   How often do you have a drink containing alcohol 4 or more times a week   How many alcohol drinks on typical day 1 or 2   How often do you have 5+ drinks at one occasion Less than monthly   Audit 2/3 Score 1   How often not able to stop drinking once started Never   How often failed to do what normally expected Never   How often needed first drink in am after a heavy drinking session Never   How often feeling of guilt or remorse after drinking Never   How often unable to remember what happened the night before Never   Have you or someone else been injured because of your drinking No   Has anyone been concerned or suggested you cut down on drinking No   TOTAL SCORE - AUDIT 5   Do you use any other substances recreationally? No     Social History     Tobacco Use    Smoking status: Never     Passive  "exposure: Never    Smokeless tobacco: Never   Vaping Use    Vaping status: Never Used   Substance Use Topics    Alcohol use: Yes     Comment: 8 drinks weekly    Drug use: No           1/21/2025   STI Screening   New sexual partner(s) since last STI/HIV test? (!) YES          1/21/2025   Contraception/Family Planning   Questions about contraception or family planning No            Objective    Exam  /85 (BP Location: Right arm, Patient Position: Sitting, Cuff Size: Adult Regular)   Pulse 86   Temp 97.7  F (36.5  C) (Tympanic)   Resp 16   Ht 1.735 m (5' 8.31\")   Wt 67 kg (147 lb 12.8 oz)   SpO2 99%   BMI 22.27 kg/m     Estimated body mass index is 22.27 kg/m  as calculated from the following:    Height as of this encounter: 1.735 m (5' 8.31\").    Weight as of this encounter: 67 kg (147 lb 12.8 oz).    Physical Exam  GENERAL: healthy, alert and no distress  EYES: PERRL, EOMI  HENT: ear canals and TM's normal. No nasal discharge. OP moist.  NECK: no adenopathy  RESP: lungs clear to auscultation - no rales, rhonchi or wheezes  CV: regular rate and rhythm, normal S1 S2, no murmur, no peripheral edema  ABDOMEN: soft, nontender, bowel sounds normal  MS: no gross musculoskeletal defects noted  SKIN: bright pink patch with white flaking upper forehead onto the scalp. Garth palms with 1 cm area of dermatitis. Patch on the left sole.  NEURO: Normal strength and tone  PSYCH: mentation appears normal, affect normal          Signed Electronically by: Ethan Adams MD    "

## 2025-01-22 ENCOUNTER — TRANSFERRED RECORDS (OUTPATIENT)
Dept: HEALTH INFORMATION MANAGEMENT | Facility: CLINIC | Age: 40
End: 2025-01-22
Payer: COMMERCIAL

## 2025-01-22 LAB
CHOLEST SERPL-MCNC: 176 MG/DL
FASTING STATUS PATIENT QL REPORTED: YES
FASTING STATUS PATIENT QL REPORTED: YES
GLUCOSE SERPL-MCNC: 99 MG/DL (ref 70–99)
HDLC SERPL-MCNC: 60 MG/DL
LDLC SERPL CALC-MCNC: 97 MG/DL
NONHDLC SERPL-MCNC: 116 MG/DL
TRIGL SERPL-MCNC: 93 MG/DL

## 2025-01-27 PROBLEM — L40.9 PSORIASIS: Status: ACTIVE | Noted: 2025-01-27

## 2025-03-11 ENCOUNTER — OFFICE VISIT (OUTPATIENT)
Dept: PEDIATRICS | Facility: CLINIC | Age: 40
End: 2025-03-11
Payer: COMMERCIAL

## 2025-03-11 ENCOUNTER — NURSE TRIAGE (OUTPATIENT)
Dept: PEDIATRICS | Facility: CLINIC | Age: 40
End: 2025-03-11

## 2025-03-11 VITALS
TEMPERATURE: 97.8 F | HEART RATE: 88 BPM | WEIGHT: 149 LBS | BODY MASS INDEX: 22.58 KG/M2 | SYSTOLIC BLOOD PRESSURE: 111 MMHG | HEIGHT: 68 IN | OXYGEN SATURATION: 98 % | RESPIRATION RATE: 16 BRPM | DIASTOLIC BLOOD PRESSURE: 70 MMHG

## 2025-03-11 DIAGNOSIS — J18.9 PNEUMONIA DUE TO INFECTIOUS ORGANISM, UNSPECIFIED LATERALITY, UNSPECIFIED PART OF LUNG: ICD-10-CM

## 2025-03-11 DIAGNOSIS — R05.2 SUBACUTE COUGH: Primary | ICD-10-CM

## 2025-03-11 PROCEDURE — G2211 COMPLEX E/M VISIT ADD ON: HCPCS | Performed by: PHYSICIAN ASSISTANT

## 2025-03-11 PROCEDURE — 99213 OFFICE O/P EST LOW 20 MIN: CPT | Performed by: PHYSICIAN ASSISTANT

## 2025-03-11 RX ORDER — FLUTICASONE PROPIONATE 50 MCG
1 SPRAY, SUSPENSION (ML) NASAL DAILY
Qty: 16 G | Refills: 2 | Status: SHIPPED | OUTPATIENT
Start: 2025-03-11

## 2025-03-11 ASSESSMENT — PAIN SCALES - GENERAL: PAINLEVEL_OUTOF10: NO PAIN (0)

## 2025-03-11 NOTE — PROGRESS NOTES
Assessment & Plan     Subacute cough  Suspect post pneumonia cough. Lungs clear, no fever, no SOB, difficulty breathing.   Discussed considering x-ray order but do not feel it is needed today.  Recommend patient take mucinex and flonase for a few days.  If symptoms are worsening would order x-ray and consider alternate antibiotic.  Pt can send [employer] message with updates.   Consider adding in allergy medication if all symptoms do not resolve.  - fluticasone (FLONASE) 50 MCG/ACT nasal spray  Dispense: 16 g; Refill: 2    Pneumonia due to infectious organism, unspecified laterality, unspecified part of lung  No imaging or docs to review.  Lungs clear on exam. Based on symptoms do not feel x-ray is needed today.    The longitudinal plan of care for the diagnosis(es)/condition(s) as documented were addressed during this visit. Due to the added complexity in care, I will continue to support Zeferino in the subsequent management and with ongoing continuity of care.    MED REC REQUIRED  Post Medication Reconciliation Status:  Patient was not discharged from an inpatient facility or TCU    FUTURE APPOINTMENTS:       - Follow-up visit as needed    Subjective   Zeferino is a 39 year old, presenting for the following health issues:  ER F/U        3/11/2025     3:23 PM   Additional Questions   Roomed by avnessa   Accompanied by marce         3/11/2025     3:23 PM   Patient Reported Additional Medications   Patient reports taking the following new medications na     History of Present Illness       Reason for visit:  Cough and pneumonia    He eats 0-1 servings of fruits and vegetables daily.He consumes 0 sweetened beverage(s) daily.He exercises with enough effort to increase his heart rate 30 to 60 minutes per day.  He exercises with enough effort to increase his heart rate 5 days per week. He is missing 1 dose(s) of medications per week.  He is not taking prescribed medications regularly due to remembering to take.      ED/UC  "Followup:    Facility:  [facility]   Date of visit: 2/28/25  Reason for visit: pneumonia   Current Status: pt feeling a lot better but this morning noticed green mucus with coughing        Pt diagnosed with pneumonia on 2/28 and started on Augmentin. Reports there was also suspicion of ear infection. Pt states he has been feeling better but then had green mucus that he was coughing up this morning. Previously it had been clear. Pt completed antibiotic course 2 days ago. He was prescribed Albuterol, feels he has not really needed it. Pt reports he has not been sleeping well which may contribute to not improving quicker. Denies fevers, chills, SOB, difficulty breathing.     Review of Systems  Constitutional, HEENT, cardiovascular, pulmonary, gi and gu systems are negative, except as otherwise noted.      Objective    /70   Pulse 88   Temp 97.8  F (36.6  C) (Temporal)   Resp 16   Ht 1.735 m (5' 8.31\")   Wt 67.6 kg (149 lb)   SpO2 98%   BMI 22.45 kg/m    Body mass index is 22.45 kg/m .    Physical Exam   GENERAL: alert and no distress  EYES: Eyes grossly normal to inspection, PERRL and conjunctivae and sclerae normal  HENT: ear canals and TM's normal, nose and mouth without ulcers or lesions  NECK: no adenopathy, no asymmetry, masses, or scars  RESP: lungs clear to auscultation - no rales, rhonchi or wheezes. Cough present. Normal breathing effort.  CV: regular rate and rhythm, normal S1 S2, no S3 or S4, no murmur, click or rub, no peripheral edema  ABDOMEN: soft, nontender, no hepatosplenomegaly, no masses and bowel sounds normal  MS: no gross musculoskeletal defects noted, no edema  SKIN: no suspicious lesions or rashes  PSYCH: mentation appears normal, affect normal/bright        Signed Electronically by: Lilliana Gomez PA-C    "

## 2025-03-11 NOTE — PATIENT INSTRUCTIONS
Recommend OTC symptomatic cares as needed.    OTC options:  Mucinex for congestion  Flonase (nasal spray) for congestion and ear pressure  Ibuprofen/Tylenol for pain or body aches  Robitussin or Delsym for cough    Consider adding in allergy medication (Zyrtec or Claritin)      Let me know by the end of the week

## 2025-03-11 NOTE — TELEPHONE ENCOUNTER
"Nurse Triage SBAR    Is this a 2nd Level Triage? NO    Situation: Pt calls to report persisting cough after pneumonia treatment.     Background: Pt reports being seen at a [facility] on 2/28/25. Pt reports he was diagnosed with acute pneumonia, wheezing, ear infection and possible sinus infection. Pt reports being prescribed amoxicillin and albuterol inhaler for wheezing. Pt reports finishing the abx and using albuterol daily PRN. Pt reports after finishing the abx, his symptoms greatly improved. Pt reports symptoms are now starting to worsen again. Pt does report high stress and poor sleep over the past couple weeks. Pt denies history of heart or lung disease. Pt denies hx of blood clots. Pt reports returning from [facility] about one month ago. Pt denies known exposure to flu, covid, strep or whooping cough. Pt does report a friend also being diagnosed with acute pneumonia recently.     Assessment: Pt reports waking this morning with congestion, productive cough frequency increased with milky green mucus, ear congestion and \"little rattles in the lungs with deep breath, does not go away when clearing throat\". Pt endorses occasional flecks of blood in nasal discharge, denies epistaxis and hemoptysis. Pt denies shortness or breath, difficulty breathing, chest pain and fever.     Protocol Recommended Disposition:   Go To Office Now    Recommendation: Recommended pt be seen in clinic now per protocol. Advised calling back or seeking care sooner for any new or worsening symptoms. Pt verbalized understanding and agrees with plan. Scheduled pt to be seen in clinic, check-in 3:10 PM.     Reason for Disposition   Wheezing is present    Additional Information   Negative: Bluish (or gray) lips or face   Negative: SEVERE difficulty breathing (e.g., struggling for each breath, speaks in single words)   Negative: Rapid onset of cough and has hives   Negative: Coughing started suddenly after medicine, an allergic food or bee " sting   Negative: Difficulty breathing after exposure to flames, smoke, or fumes   Negative: Sounds like a life-threatening emergency to the triager   Negative: Previous asthma attacks and this feels like asthma attack   Negative: Dry cough (non-productive; no sputum or minimal clear sputum) and within 14 days of COVID-19 Exposure   Negative: MODERATE difficulty breathing (e.g., speaks in phrases, SOB even at rest, pulse 100-120) and still present when not coughing   Negative: Chest pain present when not coughing   Negative: Passed out (e.g., fainted, lost consciousness, blacked out and was not responding)   Negative: Patient sounds very sick or weak to the triager    Protocols used: Cough-A-OH    Lan Deluna RN on 3/11/2025 at 2:48 PM

## 2025-05-21 NOTE — TELEPHONE ENCOUNTER
DIAGNOSIS: sports related - groin and abdomen injury / self / BLUE PLUS / NO IMG    APPOINTMENT DATE: 5/22/25    NOTES STATUS DETAILS   OFFICE NOTE from referring provider Internal Self    MEDICATION LIST Internal

## 2025-05-22 ENCOUNTER — OFFICE VISIT (OUTPATIENT)
Dept: ORTHOPEDICS | Facility: CLINIC | Age: 40
End: 2025-05-22
Payer: COMMERCIAL

## 2025-05-22 ENCOUNTER — PRE VISIT (OUTPATIENT)
Dept: ORTHOPEDICS | Facility: CLINIC | Age: 40
End: 2025-05-22

## 2025-05-22 ENCOUNTER — MYC REFILL (OUTPATIENT)
Dept: PEDIATRICS | Facility: CLINIC | Age: 40
End: 2025-05-22

## 2025-05-22 DIAGNOSIS — S76.212A STRAIN OF ADDUCTOR MAGNUS MUSCLE OF LEFT LOWER EXTREMITY, INITIAL ENCOUNTER: ICD-10-CM

## 2025-05-22 DIAGNOSIS — M25.552 LEFT HIP PAIN: Primary | ICD-10-CM

## 2025-05-22 DIAGNOSIS — F98.8 ATTENTION DEFICIT DISORDER (ADD) WITHOUT HYPERACTIVITY: ICD-10-CM

## 2025-05-22 RX ORDER — DEXTROAMPHETAMINE SACCHARATE, AMPHETAMINE ASPARTATE, DEXTROAMPHETAMINE SULFATE AND AMPHETAMINE SULFATE 2.5; 2.5; 2.5; 2.5 MG/1; MG/1; MG/1; MG/1
10 TABLET ORAL DAILY PRN
Qty: 30 TABLET | Refills: 0 | Status: SHIPPED | OUTPATIENT
Start: 2025-05-22

## 2025-05-22 RX ORDER — DEXTROAMPHETAMINE SACCHARATE, AMPHETAMINE ASPARTATE MONOHYDRATE, DEXTROAMPHETAMINE SULFATE AND AMPHETAMINE SULFATE 5; 5; 5; 5 MG/1; MG/1; MG/1; MG/1
20 CAPSULE, EXTENDED RELEASE ORAL DAILY
Qty: 30 CAPSULE | Refills: 0 | Status: SHIPPED | OUTPATIENT
Start: 2025-05-22

## 2025-05-22 SDOH — HEALTH STABILITY: PHYSICAL HEALTH: ON AVERAGE, HOW MANY DAYS PER WEEK DO YOU ENGAGE IN MODERATE TO STRENUOUS EXERCISE (LIKE A BRISK WALK)?: 6 DAYS

## 2025-05-22 SDOH — HEALTH STABILITY: PHYSICAL HEALTH: ON AVERAGE, HOW MANY MINUTES DO YOU ENGAGE IN EXERCISE AT THIS LEVEL?: 30 MIN

## 2025-05-22 NOTE — LETTER
5/22/2025      RE: Zeferino Reyna  [address]  [address]  [place] [address]     Dear Colleague,    Thank you for referring your patient, Zeferino Reyna, to the [facility] [place]. Please see a copy of my visit note below.    Sports Medicine Clinic Visit    PCP: Ethan Adams    Zeferino Reyna is a 39 year old male who is seen  as self referral presenting with left groin and lower abdomen area.     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  Indicates that in 2 weeks prior to that he could feel some mild discomfort with running in the lower abdomen.  He denies a past history of recurrent groin injuries.    Injury: Was playing soccer and felt a pop in his groin     Location of Pain: left groin and lower abdomen   Duration of Pain: 5/14/25   Rating of Pain: 6/10  Pain is better with: Ibuprofen  Pain is worse with: Squatting, walking, sitting, stretching, running   Additional Features: NA  Treatment so far consists of: Ibuprofen  Prior History of related problems: NA    There were no vitals taken for this visit.      PMH:  Past Medical History:   Diagnosis Date     ADD (attention deficit disorder)      Adjustment disorder with mixed anxiety and depressed mood 06/05/2017     Depression inactive      Depressive disorder 2005     Eczema      Moderate major depression (H) 10/03/2017     Nasal septal deviation 05/12/2023    Added automatically from request for surgery [id number]       Nasal turbinate hypertrophy 05/12/2023    Added automatically from request for surgery [id number]         Active problem list:  Patient Active Problem List   Diagnosis     Attention deficit hyperactivity disorder (ADHD), predominantly inattentive type     Chondromalacia patellae     Left varicocele     Psoriasis       FH:  Family History   Problem Relation Age of Onset      Anxiety Disorder Mother      Anxiety Disorder Father      Cerebrovascular Disease Maternal Grandmother      Breast Cancer Paternal Grandmother      GERD Brother 30     No Known Problems Maternal Grandfather      GERD Brother 30     No Known Problems Paternal Grandfather        SH:  Social History     Socioeconomic History     Marital status: Single     Spouse name: Not on file     Number of children: Not on file     Years of education: Not on file     Highest education level: Not on file   Occupational History     Not on file   Tobacco Use     Smoking status: Never     Passive exposure: Never     Smokeless tobacco: Never   Vaping Use     Vaping status: Never Used   Substance and Sexual Activity     Alcohol use: Yes     Comment: 8 drinks weekly     Drug use: No     Sexual activity: Yes     Partners: Female     Birth control/protection: Pull-out method, Condom   Other Topics Concern      Service Not Asked     Blood Transfusions Not Asked     Caffeine Concern No     Occupational Exposure No     Hobby Hazards No     Sleep Concern No     Stress Concern Yes     Weight Concern No     Special Diet No     Back Care No     Exercise Yes     Comment: 2 X WEEK     Bike Helmet Not Asked     Seat Belt Yes     Self-Exams Not Asked     Parent/sibling w/ CABG, MI or angioplasty before 65F 55M? No   Social History Narrative    Lives in [place] [facility] [place] [place].      Social Drivers of Health     Financial Resource Strain: Low Risk  (1/21/2025)    Financial Resource Strain      Within the past 12 months, have you or your family members you live with been unable to get utilities (heat, electricity) when it was really needed?: No   Food Insecurity: Low Risk  (1/21/2025)    Food Insecurity      Within the past 12 months, did you worry that your food would run out before you got money to buy more?: No      Within the past 12 months, did the food you bought just not last and you didn t have money to get more?: No   Transportation Needs:  Low Risk  (1/21/2025)    Transportation Needs      Within the past 12 months, has lack of transportation kept you from medical appointments, getting your medicines, non-medical meetings or appointments, work, or from getting things that you need?: No   Physical Activity: Sufficiently Active (5/22/2025)    Exercise Vital Sign      Days of Exercise per Week: 6 days      Minutes of Exercise per Session: 30 min   Stress: No Stress Concern Present (1/21/2025)    [employer] [place] [employer] - Occupational Stress Questionnaire      Feeling of Stress : Only a little   Social Connections: Unknown (1/21/2025)    Social Connection and Isolation Panel [NHANES]      Frequency of Communication with Friends and Family: Not on file      Frequency of Social Gatherings with Friends and Family: Three times a week      Attends Orthodox Services: Not on file      Active Member of Clubs or Organizations: Not on file      Attends Club or Organization Meetings: Not on file      Marital Status: Not on file   Interpersonal Safety: Low Risk  (1/21/2025)    Interpersonal Safety      Do you feel physically and emotionally safe where you currently live?: Yes      Within the past 12 months, have you been hit, slapped, kicked or otherwise physically hurt by someone?: No      Within the past 12 months, have you been humiliated or emotionally abused in other ways by your partner or ex-partner?: No   Housing Stability: Low Risk  (1/21/2025)    Housing Stability      Do you have housing? : Yes      Are you worried about losing your housing?: No       MEDS:  See EMR, reviewed  ALL:  See EMR, reviewed    REVIEW OF SYSTEMS:  CONSTITUTIONAL:NEGATIVE for fever, chills, change in weight  INTEGUMENTARY/SKIN: NEGATIVE for worrisome rashes, moles or lesions  EYES: NEGATIVE for vision changes or irritation  ENT/MOUTH: NEGATIVE for ear, mouth and throat problems  RESP:NEGATIVE for significant cough or SOB  BREAST: NEGATIVE for masses, tenderness or  discharge  CV: NEGATIVE for chest pain, palpitations or peripheral edema  GI: NEGATIVE for nausea, abdominal pain, heartburn, or change in bowel habits  :NEGATIVE for frequency, dysuria, or hematuria  :NEGATIVE for frequency, dysuria, or hematuria  NEURO: NEGATIVE for weakness, dizziness or paresthesias  ENDOCRINE: NEGATIVE for temperature intolerance, skin/hair changes  HEME/ALLERGY/IMMUNE: NEGATIVE for bleeding problems  PSYCHIATRIC: NEGATIVE for changes in mood or affect      Objective: Logrolling of the left hip, as well as internal rotation, external rotation and abduction of the left hip is well-tolerated.  Nontender over the ASIS and hip flexion against resistance is strong.  His main area of tenderness is along the left hip adductor's as a approach to the abductor tubercle.  He seems nontender directly at the adductor tubercle, and his strength to abduct the hip against resistance is strong against resistance.  Firing of the abdominal flexors, and the obliques does not produce focal discomfort in the lower abdomen.  No inguinal masses are felt.  Overlying skin is normal.  No bruising is noted.  Appropriate in conversation and affect.      I reviewed with the patient x-rays of the hip that show no bony abnormality and no degenerative change.    Assessment: Left-sided acute adductor groin strain    Plan: We went over online pictures of both adductor groin strains and also sports hernias.  His clinical presentation today is more consistent with acute adductor groin strain.  We discussed have activity modification and he will see physical therapy at [facility] for protocol for the groin strain.  He could try some biking in the meantime but will avoid planting and pivoting sports.  We discussed the etiology and anatomy of sports hernias.  I did give him contact information for Dr. Dylan Elliott, sports hernia surgeon through [facility], if he is not able to make improvements with the help of  physical therapy over the next 8 to 12 weeks.  He had no further questions and will follow-up as needed.                    Again, thank you for allowing me to participate in the care of your patient.      Sincerely,    Gabriel Lundy MD

## 2025-05-22 NOTE — PROGRESS NOTES
Sports Medicine Clinic Visit    PCP: Ethan Adams Axel is a 39 year old male who is seen  as self referral presenting with left groin and lower abdomen area.     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  Indicates that in 2 weeks prior to that he could feel some mild discomfort with running in the lower abdomen.  He denies a past history of recurrent groin injuries.    Injury: Was playing soccer and felt a pop in his groin     Location of Pain: left groin and lower abdomen   Duration of Pain: 5/14/25   Rating of Pain: 6/10  Pain is better with: Ibuprofen  Pain is worse with: Squatting, walking, sitting, stretching, running   Additional Features: NA  Treatment so far consists of: Ibuprofen  Prior History of related problems: NA    There were no vitals taken for this visit.      PMH:  Past Medical History:   Diagnosis Date    ADD (attention deficit disorder)     Adjustment disorder with mixed anxiety and depressed mood 06/05/2017    Depression inactive     Depressive disorder 2005    Eczema     Moderate major depression (H) 10/03/2017    Nasal septal deviation 05/12/2023    Added automatically from request for surgery [id number]      Nasal turbinate hypertrophy 05/12/2023    Added automatically from request for surgery [id number]         Active problem list:  Patient Active Problem List   Diagnosis    Attention deficit hyperactivity disorder (ADHD), predominantly inattentive type    Chondromalacia patellae    Left varicocele    Psoriasis       FH:  Family History   Problem Relation Age of Onset    Anxiety Disorder Mother     Anxiety Disorder Father     Cerebrovascular Disease Maternal Grandmother     Breast Cancer Paternal Grandmother     GERD Brother 30    No Known Problems Maternal Grandfather     GERD Brother 30    No Known Problems Paternal Grandfather        SH:  Social  History     Socioeconomic History    Marital status: Single     Spouse name: Not on file    Number of children: Not on file    Years of education: Not on file    Highest education level: Not on file   Occupational History    Not on file   Tobacco Use    Smoking status: Never     Passive exposure: Never    Smokeless tobacco: Never   Vaping Use    Vaping status: Never Used   Substance and Sexual Activity    Alcohol use: Yes     Comment: 8 drinks weekly    Drug use: No    Sexual activity: Yes     Partners: Female     Birth control/protection: Pull-out method, Condom   Other Topics Concern     Service Not Asked    Blood Transfusions Not Asked    Caffeine Concern No    Occupational Exposure No    Hobby Hazards No    Sleep Concern No    Stress Concern Yes    Weight Concern No    Special Diet No    Back Care No    Exercise Yes     Comment: 2 X WEEK    Bike Helmet Not Asked    Seat Belt Yes    Self-Exams Not Asked    Parent/sibling w/ CABG, MI or angioplasty before 65F 55M? No   Social History Narrative    Lives in [facility].      Social Drivers of Health     Financial Resource Strain: Low Risk  (1/21/2025)    Financial Resource Strain     Within the past 12 months, have you or your family members you live with been unable to get utilities (heat, electricity) when it was really needed?: No   Food Insecurity: Low Risk  (1/21/2025)    Food Insecurity     Within the past 12 months, did you worry that your food would run out before you got money to buy more?: No     Within the past 12 months, did the food you bought just not last and you didn t have money to get more?: No   Transportation Needs: Low Risk  (1/21/2025)    Transportation Needs     Within the past 12 months, has lack of transportation kept you from medical appointments, getting your medicines, non-medical meetings or appointments, work, or from getting things that you need?: No   Physical Activity: Sufficiently Active (5/22/2025)    Exercise  Vital Sign     Days of Exercise per Week: 6 days     Minutes of Exercise per Session: 30 min   Stress: No Stress Concern Present (1/21/2025)    [employer] [place] [employer] - Occupational Stress Questionnaire     Feeling of Stress : Only a little   Social Connections: Unknown (1/21/2025)    Social Connection and Isolation Panel [NHANES]     Frequency of Communication with Friends and Family: Not on file     Frequency of Social Gatherings with Friends and Family: Three times a week     Attends Gnosticist Services: Not on file     Active Member of Clubs or Organizations: Not on file     Attends Club or Organization Meetings: Not on file     Marital Status: Not on file   Interpersonal Safety: Low Risk  (1/21/2025)    Interpersonal Safety     Do you feel physically and emotionally safe where you currently live?: Yes     Within the past 12 months, have you been hit, slapped, kicked or otherwise physically hurt by someone?: No     Within the past 12 months, have you been humiliated or emotionally abused in other ways by your partner or ex-partner?: No   Housing Stability: Low Risk  (1/21/2025)    Housing Stability     Do you have housing? : Yes     Are you worried about losing your housing?: No       MEDS:  See EMR, reviewed  ALL:  See EMR, reviewed    REVIEW OF SYSTEMS:  CONSTITUTIONAL:NEGATIVE for fever, chills, change in weight  INTEGUMENTARY/SKIN: NEGATIVE for worrisome rashes, moles or lesions  EYES: NEGATIVE for vision changes or irritation  ENT/MOUTH: NEGATIVE for ear, mouth and throat problems  RESP:NEGATIVE for significant cough or SOB  BREAST: NEGATIVE for masses, tenderness or discharge  CV: NEGATIVE for chest pain, palpitations or peripheral edema  GI: NEGATIVE for nausea, abdominal pain, heartburn, or change in bowel habits  :NEGATIVE for frequency, dysuria, or hematuria  :NEGATIVE for frequency, dysuria, or hematuria  NEURO: NEGATIVE for weakness, dizziness or paresthesias  ENDOCRINE: NEGATIVE  for temperature intolerance, skin/hair changes  HEME/ALLERGY/IMMUNE: NEGATIVE for bleeding problems  PSYCHIATRIC: NEGATIVE for changes in mood or affect      Objective: Logrolling of the left hip, as well as internal rotation, external rotation and abduction of the left hip is well-tolerated.  Nontender over the ASIS and hip flexion against resistance is strong.  His main area of tenderness is along the left hip adductor's as a approach to the abductor tubercle.  He seems nontender directly at the adductor tubercle, and his strength to abduct the hip against resistance is strong against resistance.  Firing of the abdominal flexors, and the obliques does not produce focal discomfort in the lower abdomen.  No inguinal masses are felt.  Overlying skin is normal.  No bruising is noted.  Appropriate in conversation and affect.      I reviewed with the patient x-rays of the hip that show no bony abnormality and no degenerative change.    Assessment: Left-sided acute adductor groin strain    Plan: We went over online pictures of both adductor groin strains and also sports hernias.  His clinical presentation today is more consistent with acute adductor groin strain.  We discussed have activity modification and he will see physical therapy at [facility] for protocol for the groin strain.  He could try some biking in the meantime but will avoid planting and pivoting sports.  We discussed the etiology and anatomy of sports hernias.  I did give him contact information for Dr. Dylan Elliott, sports hernia surgeon through [facility], if he is not able to make improvements with the help of physical therapy over the next 8 to 12 weeks.  He had no further questions and will follow-up as needed.

## 2025-06-02 ENCOUNTER — RESULTS FOLLOW-UP (OUTPATIENT)
Dept: INTERNAL MEDICINE | Facility: CLINIC | Age: 40
End: 2025-06-02

## 2025-06-02 ENCOUNTER — OFFICE VISIT (OUTPATIENT)
Dept: FAMILY MEDICINE | Facility: CLINIC | Age: 40
End: 2025-06-02
Payer: COMMERCIAL

## 2025-06-02 VITALS
BODY MASS INDEX: 22.58 KG/M2 | SYSTOLIC BLOOD PRESSURE: 108 MMHG | WEIGHT: 149 LBS | TEMPERATURE: 98 F | HEIGHT: 68 IN | RESPIRATION RATE: 17 BRPM | DIASTOLIC BLOOD PRESSURE: 71 MMHG | HEART RATE: 84 BPM | OXYGEN SATURATION: 98 %

## 2025-06-02 DIAGNOSIS — J02.9 ACUTE PHARYNGITIS, UNSPECIFIED ETIOLOGY: Primary | ICD-10-CM

## 2025-06-02 DIAGNOSIS — J02.9 SORE THROAT: Primary | ICD-10-CM

## 2025-06-02 LAB
DEPRECATED S PYO AG THROAT QL EIA: NEGATIVE
S PYO DNA THROAT QL NAA+PROBE: NOT DETECTED

## 2025-06-02 PROCEDURE — G2211 COMPLEX E/M VISIT ADD ON: HCPCS

## 2025-06-02 PROCEDURE — 87651 STREP A DNA AMP PROBE: CPT

## 2025-06-02 PROCEDURE — 99213 OFFICE O/P EST LOW 20 MIN: CPT

## 2025-06-02 RX ORDER — LIDOCAINE HYDROCHLORIDE 20 MG/ML
15 SOLUTION OROPHARYNGEAL
Qty: 100 ML | Refills: 0 | Status: SHIPPED | OUTPATIENT
Start: 2025-06-02

## 2025-06-02 ASSESSMENT — PAIN SCALES - GENERAL: PAINLEVEL_OUTOF10: SEVERE PAIN (7)

## 2025-06-02 NOTE — PROGRESS NOTES
Assessment & Plan     Acute pharyngitis, unspecified etiology  Throat erythematous with small ulcerations on the right side that appear viral but will rule out strep infection, PCR pending. Reviewed symptoms treatment and can use oral lidocaine as needed. Continue NSAIDS, lozenges. Return precautions discussed, including when to seek urgent/emergent care. Verbalized understanding and agrees with plan.   - Streptococcus A Rapid Screen w/Reflex to PCR - Clinic Collect  - lidocaine, viscous, (XYLOCAINE) 2 % solution  Dispense: 100 mL; Refill: 0  - Group A Streptococcus PCR Throat Swab      Plan to follow up if symptoms do not improve or worsen.          Edgar Mcgarry is a 39 year old, presenting for the following health issues:  History of Present Illness (Patient states he has right ear pain,low grade fever, and a Sore and swollen throat with visible white spots that gets worse with talking and swallowing, since last Tuesday. Was seen at [facility] clinic on friday)      6/2/2025    10:49 AM   Additional Questions   Roomed by madan chase     History of Present Illness       Reason for visit:  Sore throat  Symptom onset:  3-7 days ago  Symptoms include:  Sore throat, with white spots  Symptom intensity:  Severe  Symptom progression:  Worsening  Had these symptoms before:  Yes  Has tried/received treatment for these symptoms:  Yes  Previous treatment was successful:  No  What makes it worse:  Talking, swallowing  What makes it better:  Ibuprofen He is missing 1 dose(s) of medications per week.  He is not taking prescribed medications regularly due to remembering to take.        Sore throat started on Tuesday - 6 days ago.  Went to [facility] clinic on Friday 3 days ago  - no swabs was told possible allergies.  Continues to have significant throat pain, worse on the right. Hurts to swallow and making it difficult to sleep.  Been taking ibuprofen which helps a little.   Some ear pain on the right side as well.  Low grade temp  "of 99 at home.    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    Denies any n/v, shortness of breath, chest pain, rash, sinus pressure, congestion, cough.       Review of Systems  Constitutional, HEENT, cardiovascular, pulmonary, gi and gu systems are negative, except as otherwise noted.      Objective    /71 (BP Location: Left arm, Patient Position: Sitting, Cuff Size: Adult Regular)   Pulse 84   Temp 98  F (36.7  C)   Resp 17   Ht 1.727 m (5' 8\")   Wt 67.6 kg (149 lb)   SpO2 98%   BMI 22.66 kg/m    Body mass index is 22.66 kg/m .    Physical Exam   GENERAL: alert and no distress  EYES: Eyes grossly normal to inspection, PERRL and conjunctivae and sclerae normal  HENT: ear canals and TM's normal and erythema with some   NECK: no adenopathy, no asymmetry, masses, or scars  PSYCH: mentation appears normal, affect normal/bright    Results for orders placed or performed in visit on 06/02/25 (from the past 24 hours)   Streptococcus A Rapid Screen w/Reflex to PCR - Clinic Collect    Specimen: Throat; Swab   Result Value Ref Range    Group A Strep antigen Negative Negative           Signed Electronically by: BAILEY Crowell CNP    "

## 2025-07-14 ENCOUNTER — MYC REFILL (OUTPATIENT)
Dept: PEDIATRICS | Facility: CLINIC | Age: 40
End: 2025-07-14
Payer: COMMERCIAL

## 2025-07-14 DIAGNOSIS — F98.8 ATTENTION DEFICIT DISORDER (ADD) WITHOUT HYPERACTIVITY: ICD-10-CM

## 2025-07-14 RX ORDER — DEXTROAMPHETAMINE SACCHARATE, AMPHETAMINE ASPARTATE MONOHYDRATE, DEXTROAMPHETAMINE SULFATE AND AMPHETAMINE SULFATE 5; 5; 5; 5 MG/1; MG/1; MG/1; MG/1
20 CAPSULE, EXTENDED RELEASE ORAL DAILY
Qty: 30 CAPSULE | Refills: 0 | Status: SHIPPED | OUTPATIENT
Start: 2025-07-14

## 2025-07-14 RX ORDER — DEXTROAMPHETAMINE SACCHARATE, AMPHETAMINE ASPARTATE, DEXTROAMPHETAMINE SULFATE AND AMPHETAMINE SULFATE 2.5; 2.5; 2.5; 2.5 MG/1; MG/1; MG/1; MG/1
10 TABLET ORAL DAILY PRN
Qty: 30 TABLET | Refills: 0 | Status: SHIPPED | OUTPATIENT
Start: 2025-07-14

## (undated) DEVICE — PACK MINOR SBA15MIFSE

## (undated) DEVICE — TUBING SUCTION 10'X3/16" N510

## (undated) DEVICE — SU CHROMIC 5-0 P-3 18" 687G

## (undated) DEVICE — SPONGE COTTONOID 1/2X3" 80-1407

## (undated) DEVICE — PREP POVIDONE IODINE SWABS X3

## (undated) DEVICE — SU ETHILON 3-0 FS-1 18" 669H

## (undated) DEVICE — DRAPE SPLIT EENT 76X124" 3X28" 9447

## (undated) DEVICE — NDL SPINAL 25GA 3.5" QUINCKE 405180

## (undated) DEVICE — SU CHROMIC 4-0 P-3 18" 1654G

## (undated) DEVICE — SPECIMEN CONTAINER 4OZ

## (undated) DEVICE — SYR 10ML FINGER CONTROL W/O NDL 309695

## (undated) DEVICE — SOL WATER IRRIG 1000ML BOTTLE 07139-09

## (undated) DEVICE — BLADE INFERIOR TURBINATE 2.9MMX11CM 1882940HR

## (undated) DEVICE — GLOVE BIOGEL PI ULTRATOUCH G SZ 7.5 42175

## (undated) DEVICE — Device

## (undated) DEVICE — SYR EAR BULB 3OZ 0035830

## (undated) RX ORDER — COCAINE HYDROCHLORIDE 40 MG/ML
SOLUTION NASAL
Status: DISPENSED
Start: 2023-07-17

## (undated) RX ORDER — CEFAZOLIN SODIUM 1 G/3ML
INJECTION, POWDER, FOR SOLUTION INTRAMUSCULAR; INTRAVENOUS
Status: DISPENSED
Start: 2023-07-17

## (undated) RX ORDER — FENTANYL CITRATE 0.05 MG/ML
INJECTION, SOLUTION INTRAMUSCULAR; INTRAVENOUS
Status: DISPENSED
Start: 2023-07-17

## (undated) RX ORDER — PROPOFOL 10 MG/ML
INJECTION, EMULSION INTRAVENOUS
Status: DISPENSED
Start: 2023-07-17

## (undated) RX ORDER — ACETAMINOPHEN 325 MG/1
TABLET ORAL
Status: DISPENSED
Start: 2023-07-17

## (undated) RX ORDER — OXYCODONE HYDROCHLORIDE 5 MG/1
TABLET ORAL
Status: DISPENSED
Start: 2023-07-17